# Patient Record
Sex: FEMALE | Race: WHITE
[De-identification: names, ages, dates, MRNs, and addresses within clinical notes are randomized per-mention and may not be internally consistent; named-entity substitution may affect disease eponyms.]

---

## 2017-02-16 ENCOUNTER — TRANSCRIPTION ENCOUNTER (OUTPATIENT)
Age: 37
End: 2017-02-16

## 2017-04-05 ENCOUNTER — TRANSCRIPTION ENCOUNTER (OUTPATIENT)
Age: 37
End: 2017-04-05

## 2017-05-25 ENCOUNTER — TRANSCRIPTION ENCOUNTER (OUTPATIENT)
Age: 37
End: 2017-05-25

## 2017-06-08 ENCOUNTER — TRANSCRIPTION ENCOUNTER (OUTPATIENT)
Age: 37
End: 2017-06-08

## 2017-11-26 ENCOUNTER — TRANSCRIPTION ENCOUNTER (OUTPATIENT)
Age: 37
End: 2017-11-26

## 2018-10-03 ENCOUNTER — TRANSCRIPTION ENCOUNTER (OUTPATIENT)
Age: 38
End: 2018-10-03

## 2018-11-09 ENCOUNTER — TRANSCRIPTION ENCOUNTER (OUTPATIENT)
Age: 38
End: 2018-11-09

## 2018-12-11 ENCOUNTER — RX RENEWAL (OUTPATIENT)
Age: 38
End: 2018-12-11

## 2018-12-18 ENCOUNTER — RESULT REVIEW (OUTPATIENT)
Age: 38
End: 2018-12-18

## 2018-12-18 ENCOUNTER — APPOINTMENT (OUTPATIENT)
Dept: INTERNAL MEDICINE | Facility: CLINIC | Age: 38
End: 2018-12-18
Payer: COMMERCIAL

## 2018-12-18 VITALS
SYSTOLIC BLOOD PRESSURE: 130 MMHG | BODY MASS INDEX: 41.16 KG/M2 | DIASTOLIC BLOOD PRESSURE: 82 MMHG | HEIGHT: 61 IN | WEIGHT: 218 LBS

## 2018-12-18 PROCEDURE — 99213 OFFICE O/P EST LOW 20 MIN: CPT

## 2018-12-18 NOTE — PHYSICAL EXAM

## 2018-12-18 NOTE — HISTORY OF PRESENT ILLNESS
[FreeTextEntry8] : Patient is a 38-year-old female returning after 5 weeks of therapy with hydrochlorothiazide for high blood pressure. She's tolerated the medications very well. Her sense of well-being is good. She is eating well sleeping well. No night sweats. No chills. No fever. She started a p.o. diet and has lost 12 pounds in the last 5 weeks. She is very happy. She is complaining of swelling around her right leg. She has known venous insufficiency, but she also has calf tenderness, and she also has positive Homans sign. Ultrasound is being sought.

## 2018-12-18 NOTE — PLAN
[FreeTextEntry1] : Patient is a 38-year-old female returning after 5 weeks of therapy with hydrochlorothiazide for high blood pressure. She's tolerated the medications very well. Her sense of well-being is good. She is eating well sleeping well. No night sweats. No chills. No fever. She started a p.o. diet and has lost 12 pounds in the last 5 weeks. She is very happy. She is complaining of swelling around her right leg. She has known venous insufficiency, but she also has calf tenderness, and she also has positive Homans sign. Ultrasound is being sought.

## 2018-12-21 ENCOUNTER — RX RENEWAL (OUTPATIENT)
Age: 38
End: 2018-12-21

## 2019-01-25 ENCOUNTER — RX RENEWAL (OUTPATIENT)
Age: 39
End: 2019-01-25

## 2019-02-19 ENCOUNTER — RECORD ABSTRACTING (OUTPATIENT)
Age: 39
End: 2019-02-19

## 2019-02-19 DIAGNOSIS — Z92.89 PERSONAL HISTORY OF OTHER MEDICAL TREATMENT: ICD-10-CM

## 2019-02-19 DIAGNOSIS — A63.0 ANOGENITAL (VENEREAL) WARTS: ICD-10-CM

## 2019-02-26 ENCOUNTER — TRANSCRIPTION ENCOUNTER (OUTPATIENT)
Age: 39
End: 2019-02-26

## 2019-02-27 ENCOUNTER — APPOINTMENT (OUTPATIENT)
Dept: INTERNAL MEDICINE | Facility: CLINIC | Age: 39
End: 2019-02-27

## 2019-03-12 ENCOUNTER — APPOINTMENT (OUTPATIENT)
Dept: INTERNAL MEDICINE | Facility: CLINIC | Age: 39
End: 2019-03-12

## 2019-05-22 ENCOUNTER — RX RENEWAL (OUTPATIENT)
Age: 39
End: 2019-05-22

## 2019-07-16 ENCOUNTER — RX RENEWAL (OUTPATIENT)
Age: 39
End: 2019-07-16

## 2019-08-14 ENCOUNTER — RX RENEWAL (OUTPATIENT)
Age: 39
End: 2019-08-14

## 2019-11-06 ENCOUNTER — APPOINTMENT (OUTPATIENT)
Dept: INTERNAL MEDICINE | Facility: CLINIC | Age: 39
End: 2019-11-06
Payer: COMMERCIAL

## 2019-11-06 PROCEDURE — 90686 IIV4 VACC NO PRSV 0.5 ML IM: CPT

## 2019-11-06 PROCEDURE — 90471 IMMUNIZATION ADMIN: CPT

## 2019-11-16 ENCOUNTER — RX RENEWAL (OUTPATIENT)
Age: 39
End: 2019-11-16

## 2020-01-30 ENCOUNTER — APPOINTMENT (OUTPATIENT)
Dept: OBGYN | Facility: CLINIC | Age: 40
End: 2020-01-30
Payer: COMMERCIAL

## 2020-01-30 VITALS
BODY MASS INDEX: 43.05 KG/M2 | DIASTOLIC BLOOD PRESSURE: 72 MMHG | SYSTOLIC BLOOD PRESSURE: 122 MMHG | WEIGHT: 228 LBS | HEIGHT: 61 IN

## 2020-01-30 DIAGNOSIS — R92.2 INCONCLUSIVE MAMMOGRAM: ICD-10-CM

## 2020-01-30 DIAGNOSIS — Z12.31 ENCOUNTER FOR SCREENING MAMMOGRAM FOR MALIGNANT NEOPLASM OF BREAST: ICD-10-CM

## 2020-01-30 PROCEDURE — 99396 PREV VISIT EST AGE 40-64: CPT

## 2020-01-30 RX ORDER — ASPIRIN ENTERIC COATED TABLETS 81 MG 81 MG/1
81 TABLET, DELAYED RELEASE ORAL
Refills: 0 | Status: DISCONTINUED | COMMUNITY
End: 2020-01-30

## 2020-02-10 ENCOUNTER — RX RENEWAL (OUTPATIENT)
Age: 40
End: 2020-02-10

## 2020-02-13 ENCOUNTER — RESULT REVIEW (OUTPATIENT)
Age: 40
End: 2020-02-13

## 2020-03-10 ENCOUNTER — APPOINTMENT (OUTPATIENT)
Dept: PAIN MANAGEMENT | Facility: CLINIC | Age: 40
End: 2020-03-10
Payer: COMMERCIAL

## 2020-03-10 VITALS
HEIGHT: 61 IN | DIASTOLIC BLOOD PRESSURE: 84 MMHG | SYSTOLIC BLOOD PRESSURE: 156 MMHG | WEIGHT: 220 LBS | BODY MASS INDEX: 41.54 KG/M2

## 2020-03-10 PROCEDURE — 99204 OFFICE O/P NEW MOD 45 MIN: CPT

## 2020-03-10 NOTE — PHYSICAL EXAM
[Facet Tenderness] : facet tenderness [Spine: Flexion to ___ degrees, without pain] : spine: flexion to [unfilled] degrees, without pain [SLR] : positive straight leg raise [] : Motor: [NL] : normal and symmetric bilaterally [Normal] : Normal affect [de-identified] : Constitutional: Normal, well developed, no acute distress\par Eyes: Symmetric, External structures \par Oropharynx: Lips normal, symmetric, no external lesions appreciated\par Respiratory: Non-labored breathing, no audible wheezes\par Cardiac: Pulse palpated, no tachycardia\par Vascular: No cyanosis appreciated, no edema in bilateral lower extremities\par GI: Nondistended, no jaundice appreciated\par Neurovascular: CN2-12 grossly intact, Alert and oriented\par MSK: Normal muscle bulk, 5/5 Motor strength B/L in LE\par \par

## 2020-03-10 NOTE — HISTORY OF PRESENT ILLNESS
[Back Pain] : back pain [___ yrs] : [unfilled] year(s) ago [2] : a minimum pain level of 2/10 [10] : a maximum pain level of 10/10 [Aching] : aching [5] : 3. What number best describes how, during the past week, pain has interfered with your general activity? 5/10 pain [FreeTextEntry1] : HPI\par \par Ms. EBER FIGUEROA is a 39 year F with pmhx of htn present with many years of bilateral back pain without inciting event.  It is axial and worse with transition.  Affects her ability to care for her children.  denies any worsening numbness, weakness, bowel/bladder dysfunction\par \par \par \par Previous and current pain medications/doses/effects:\par \par Advil without improvement\par \par Previous Pain Treatments:\par \par PT, Osteopathic treatment, Chiropractic care without improvement\par \par Previous Pain Injections:\par \par STEPHANI 1 year ago with mild improvement\par \par Previous Diagnostic Studies/Images:\par \par MRI LS 5/2018\par \par L5-S1 degenerative disc narrowing and endplate arthritis\par \par L5-S1 disc bulge spondylosis. No focal disc herniation. Mild bilateral degenerative frontal stenosis secondary to lateral degenerative changes. Mild facet arthritis.\par \par  [FreeTextEntry3] : per pt everything  [FreeTextEntry4] : per pt n/a  [FreeTextEntry2] : 15

## 2020-03-10 NOTE — ASSESSMENT
[FreeTextEntry1] : I personally reviewed the relevant imaging.  Discussed and explained to patient the likely source of pathology and pain.  Questions answered.\par \par Significant component of axial back pain secondary to lumbar spondylosis and facet arthropathy demonstrated on MRI LS.  Pain refractory to conservative treatments.  Will schedule BILATERAL L3-sacral ala diagnostic medial branch block r/b/a discussed\par \par May consider radiofreqency ablation\par \par \par The above diagnosis and treatment plan is medically reasonable and necessary based on the patient encounter.\par Opiod contract signed\par There were no barriers to communication.\par Informed patient that I would be available for any additional questions.\par Patient was instructed to call with any worsening symptoms including severe pain, new numbness/weakness, or changes in the bowel/bladder function. \par \par Discussed role of nsaids in pain management and all relevant risks, if patient is continuing to require after 4 weeks the patient should f/u for alternative treatment. \par Instructed patient to maintain pain diary to monitor pain level, mobility, and function.\par \par

## 2020-03-13 ENCOUNTER — APPOINTMENT (OUTPATIENT)
Dept: INTERNAL MEDICINE | Facility: CLINIC | Age: 40
End: 2020-03-13
Payer: COMMERCIAL

## 2020-03-13 VITALS
DIASTOLIC BLOOD PRESSURE: 92 MMHG | HEIGHT: 61 IN | WEIGHT: 220 LBS | SYSTOLIC BLOOD PRESSURE: 148 MMHG | BODY MASS INDEX: 41.54 KG/M2

## 2020-03-13 DIAGNOSIS — Z78.9 OTHER SPECIFIED HEALTH STATUS: ICD-10-CM

## 2020-03-13 PROCEDURE — 99213 OFFICE O/P EST LOW 20 MIN: CPT

## 2020-03-13 RX ORDER — HYDROCHLOROTHIAZIDE 50 MG/1
50 TABLET ORAL
Qty: 30 | Refills: 5 | Status: DISCONTINUED | COMMUNITY
Start: 2018-12-11 | End: 2020-03-13

## 2020-03-13 RX ORDER — YOHIMBE BARK 500 MG
CAPSULE ORAL
Refills: 0 | Status: DISCONTINUED | COMMUNITY
End: 2020-03-13

## 2020-03-13 NOTE — HISTORY OF PRESENT ILLNESS
[FreeTextEntry1] : Followup hypertension [de-identified] : 39-year-old overweight female presents for followup of hypertension. Blood pressure remained elevated on recent visits to pain management and urogynecologist in spite of taking hydrochlorothiazide 50 mg daily. Patient denies chest pain shortness of breath palpitations dizziness. Patient's mom has high blood pressure she is unsure of her father's status

## 2020-03-13 NOTE — PLAN
[FreeTextEntry1] : 39-year-old female with hypertension, no complaints offered. Discontinued hydrochlorothiazide 50 mg and started losartan 50 with hydrochlorothiazide 12.5 mg. Return to office 6 weeks for followup\par \par Patient is currently seeing pain management for back pain and uro-gynecologist for uterine prolapse, both situations make it difficult to exercise.. Reviewed diet

## 2020-04-16 ENCOUNTER — APPOINTMENT (OUTPATIENT)
Dept: PAIN MANAGEMENT | Facility: HOSPITAL | Age: 40
End: 2020-04-16

## 2020-05-04 ENCOUNTER — NON-APPOINTMENT (OUTPATIENT)
Age: 40
End: 2020-05-04

## 2020-05-04 ENCOUNTER — LABORATORY RESULT (OUTPATIENT)
Age: 40
End: 2020-05-04

## 2020-05-04 ENCOUNTER — APPOINTMENT (OUTPATIENT)
Dept: INTERNAL MEDICINE | Facility: CLINIC | Age: 40
End: 2020-05-04
Payer: COMMERCIAL

## 2020-05-04 VITALS
BODY MASS INDEX: 44.93 KG/M2 | SYSTOLIC BLOOD PRESSURE: 122 MMHG | DIASTOLIC BLOOD PRESSURE: 80 MMHG | WEIGHT: 238 LBS | HEIGHT: 61 IN

## 2020-05-04 PROCEDURE — 36415 COLL VENOUS BLD VENIPUNCTURE: CPT

## 2020-05-04 PROCEDURE — 93000 ELECTROCARDIOGRAM COMPLETE: CPT

## 2020-05-04 PROCEDURE — 99396 PREV VISIT EST AGE 40-64: CPT | Mod: 25

## 2020-05-04 NOTE — HEALTH RISK ASSESSMENT
[Very Good] : ~his/her~ current health as very good [Monthly or less (1 pt)] : Monthly or less (1 point) [Yes] : Yes [0-5] : 0-5 [Never (0 pts)] : Never (0 points) [1 or 2 (0 pts)] : 1 or 2 (0 points) [No falls in past year] : Patient reported no falls in the past year [0] : 2) Feeling down, depressed, or hopeless: Not at all (0) [Patient reported PAP Smear was normal] : Patient reported PAP Smear was normal [Patient reported mammogram was normal] : Patient reported mammogram was normal [Hepatitis C test declined] : Hepatitis C test declined [HIV test declined] : HIV test declined [] : No [IBG0Doyxe] : 0 [MammogramDate] : 02/20 [PapSmearDate] : 01/19

## 2020-05-04 NOTE — PLAN
[FreeTextEntry1] : 39-year-old female presents for annual exam, feels well, no complaints offered.\par \par Reviewed diet and exercise. Especially encouraged to improve diet and lose weight prior to surgery\par \par Call 3-4 days to review labs

## 2020-05-04 NOTE — PHYSICAL EXAM
[Obese] : patient was observed to be obese [Normal Female:] : bladder was normal on palpation [Normal] : no joint swelling, grossly normal strength/tone [FreeTextEntry1] : .r/GYN [de-identified] : Deferred GYN; Denies pain, lumps and discharge [de-identified] : No lymphadenopathy [de-identified] : Denies excessive thirst, urination, fatigue [de-identified] : Alert and Oriented x3.  Appropriate mood and affect [de-identified] : No rash or skin lesion

## 2020-05-04 NOTE — HISTORY OF PRESENT ILLNESS
[de-identified] : 39-year-old obese female presents for annual exam, feels well, having difficulty with weight particularly during this pandemic. Denies chest pain shortness of breath palpitations dizziness. Planning on cystocele repair and hysterectomy with Dr. Vargas next month, no date set yet.\par \par Denies chest pain shortness of breath palpitations dizziness\par \par Up-to-date with screenings [FreeTextEntry1] : Annual exam

## 2020-05-05 LAB
ALBUMIN SERPL ELPH-MCNC: 4.2 G/DL
ALP BLD-CCNC: 102 U/L
ALT SERPL-CCNC: 14 U/L
ANION GAP SERPL CALC-SCNC: 15 MMOL/L
APPEARANCE: ABNORMAL
AST SERPL-CCNC: 19 U/L
BASOPHILS # BLD AUTO: 0.11 K/UL
BASOPHILS NFR BLD AUTO: 0.9 %
BILIRUB SERPL-MCNC: 0.4 MG/DL
BILIRUBIN URINE: NEGATIVE
BLOOD URINE: NEGATIVE
BUN SERPL-MCNC: 8 MG/DL
CALCIUM SERPL-MCNC: 9.5 MG/DL
CHLORIDE SERPL-SCNC: 98 MMOL/L
CHOLEST SERPL-MCNC: 193 MG/DL
CHOLEST/HDLC SERPL: 3.2 RATIO
CO2 SERPL-SCNC: 25 MMOL/L
COLOR: NORMAL
CREAT SERPL-MCNC: 0.69 MG/DL
EOSINOPHIL # BLD AUTO: 0.5 K/UL
EOSINOPHIL NFR BLD AUTO: 4.3 %
FOLATE SERPL-MCNC: 8.4 NG/ML
GLUCOSE QUALITATIVE U: NEGATIVE
GLUCOSE SERPL-MCNC: 112 MG/DL
HCT VFR BLD CALC: 45.1 %
HDLC SERPL-MCNC: 60 MG/DL
HGB BLD-MCNC: 13.8 G/DL
IMM GRANULOCYTES NFR BLD AUTO: 0.6 %
KETONES URINE: NEGATIVE
LDLC SERPL CALC-MCNC: 111 MG/DL
LEUKOCYTE ESTERASE URINE: NEGATIVE
LYMPHOCYTES # BLD AUTO: 3.01 K/UL
LYMPHOCYTES NFR BLD AUTO: 25.9 %
MAN DIFF?: NORMAL
MCHC RBC-ENTMCNC: 25.7 PG
MCHC RBC-ENTMCNC: 30.6 GM/DL
MCV RBC AUTO: 84.1 FL
MONOCYTES # BLD AUTO: 0.62 K/UL
MONOCYTES NFR BLD AUTO: 5.3 %
NEUTROPHILS # BLD AUTO: 7.33 K/UL
NEUTROPHILS NFR BLD AUTO: 63 %
NITRITE URINE: NEGATIVE
PH URINE: 6.5
PLATELET # BLD AUTO: 357 K/UL
POTASSIUM SERPL-SCNC: 4.3 MMOL/L
PROT SERPL-MCNC: 7.1 G/DL
PROTEIN URINE: NEGATIVE
RBC # BLD: 5.36 M/UL
RBC # FLD: 14.6 %
SODIUM SERPL-SCNC: 138 MMOL/L
SPECIFIC GRAVITY URINE: 1.01
T4 FREE SERPL-MCNC: 0.8 NG/DL
TRIGL SERPL-MCNC: 108 MG/DL
TSH SERPL-ACNC: 1.17 UIU/ML
UROBILINOGEN URINE: NORMAL
VIT B12 SERPL-MCNC: 399 PG/ML
WBC # FLD AUTO: 11.64 K/UL

## 2020-05-26 ENCOUNTER — RX RENEWAL (OUTPATIENT)
Age: 40
End: 2020-05-26

## 2020-06-18 ENCOUNTER — APPOINTMENT (OUTPATIENT)
Dept: PAIN MANAGEMENT | Facility: CLINIC | Age: 40
End: 2020-06-18
Payer: COMMERCIAL

## 2020-06-18 PROCEDURE — 99214 OFFICE O/P EST MOD 30 MIN: CPT | Mod: 95

## 2020-06-18 NOTE — ASSESSMENT
[FreeTextEntry1] : \par Significant component of axial back pain secondary to lumbar spondylosis and facet arthropathy demonstrated on MRI LS.  Pain refractory to conservative treatments.  Will schedule BILATERAL L3-sacral ala diagnostic medial branch block r/b/a discussed\par \par May consider radiofreqency ablation\par \par ASIPP COVID Morbidity risk stratification:\par \par Age 0\par Pulmonary 0\par CVS 2\par Obesity 2\par DM 0\par Renal 0 \par Hepatic 0\par Immune 0\par \par 4  low risk for covid related morbidity -  discussed r/b/a, patient wishes to proceed\par \par Will schedule above interventional pain procedure because further delay may cause harm or negative outcome to patient.  The goal in performing this procedure is to avoid deterioration of function, emergency room visits (which increases exposure) and reliance on opioids.  \par \par r/b/a discussed with patient, lack of evidence to conclusively determine whether pain management procedures have any positive or negative impact on the possibility of camila the virus and/or development of any sequelae. \par \par Informed patient that risks associated with the COVID-19 infection.  Informed patient steps taken to limit the risks.  We are implementing safety precautions and following protocols consistent with the CDC and state recommendations. All patients and staff will be checked for fever or signs of illness upon entry to the facility. We will limit our steroid dose to the lowest effective therapeutic dose or in some cases steroids will not be injected at all. \par \par Patient agrees to proceed\par \par \par There were no barriers to communication.\par Informed patient that I would be available for any additional questions.\par Patient was instructed to call with any worsening symptoms including severe pain, new numbness/weakness, or changes in the bowel/bladder function. \par \par Discussed role of nsaids in pain management and all relevant risks, if patient is continuing to require after 4 weeks the patient should f/u for alternative treatment. \par Instructed patient to maintain pain diary to monitor pain level, mobility, and function.\par \par

## 2020-06-18 NOTE — HISTORY OF PRESENT ILLNESS
[Back Pain] : back pain [___ yrs] : [unfilled] year(s) ago [5] : an average pain level of 5/10 [2] : a minimum pain level of 2/10 [10] : a maximum pain level of 10/10 [Aching] : aching [Home] : at home, [unfilled] , at the time of the visit. [Medical Office: ([facility])___] : at the medical office located in  [Verbal consent obtained from patient] : the patient, [unfilled] [FreeTextEntry1] : Interval Note:\par \par Since last visit the pain is not improved.  States that she continues to have significant lower back pain, she has trouble sleeping at night because of the pain. States that the pain is worse with twisting and turning. She tried flexeril in the past without improvement. Patient Denies any additional weakness, numbness, bowel/bladder dysfunction.  \par \par \par HPI\par \par Ms. EBER FIGUEROA is a 39 year F with pmhx of htn present with many years of bilateral back pain without inciting event.  It is axial and worse with transition.  Affects her ability to care for her children.  denies any worsening numbness, weakness, bowel/bladder dysfunction\par \par \par \par Previous and current pain medications/doses/effects:\par \par Advil without improvement\par \par Previous Pain Treatments:\par \par PT, Osteopathic treatment, Chiropractic care without improvement\par \par Previous Pain Injections:\par \par STEPHANI 1 year ago with mild improvement\par \par Previous Diagnostic Studies/Images:\par \par MRI LS 5/2018\par \par L5-S1 degenerative disc narrowing and endplate arthritis\par \par L5-S1 disc bulge spondylosis. No focal disc herniation. Mild bilateral degenerative frontal stenosis secondary to lateral degenerative changes. Mild facet arthritis.\par \par  [FreeTextEntry3] : per pt everything  [FreeTextEntry4] : per pt n/a

## 2020-07-09 ENCOUNTER — APPOINTMENT (OUTPATIENT)
Dept: PAIN MANAGEMENT | Facility: HOSPITAL | Age: 40
End: 2020-07-09

## 2020-07-09 ENCOUNTER — RESULT REVIEW (OUTPATIENT)
Age: 40
End: 2020-07-09

## 2020-07-10 ENCOUNTER — APPOINTMENT (OUTPATIENT)
Dept: PAIN MANAGEMENT | Facility: CLINIC | Age: 40
End: 2020-07-10
Payer: COMMERCIAL

## 2020-07-10 PROCEDURE — 99213 OFFICE O/P EST LOW 20 MIN: CPT | Mod: 95

## 2020-07-10 NOTE — HISTORY OF PRESENT ILLNESS
[Home] : at home, [unfilled] , at the time of the visit. [Medical Office: ([facility])___] : at the medical office located in  [Verbal consent obtained from patient] : the patient, [unfilled] [Back Pain] : back pain [___ yrs] : [unfilled] year(s) ago [5] : an average pain level of 5/10 [2] : a minimum pain level of 2/10 [10] : a maximum pain level of 10/10 [Aching] : aching [FreeTextEntry1] : Interval Note:\par s/p bilateral L3-sacral ala medial branch block with 100% improvement in axial back pain.  Patient states that she is doing very well.  Able to move without pain since intervention. \par Since last visit the pain is improved. Denies any additional weakness, numbness, bowel/bladder dysfunction.  \par \par \par HPI\par \par Ms. EBER FIGUEROA is a 39 year F with pmhx of htn present with many years of bilateral back pain without inciting event.  It is axial and worse with transition.  Affects her ability to care for her children.  denies any worsening numbness, weakness, bowel/bladder dysfunction\par \par \par \par Previous and current pain medications/doses/effects:\par \par Advil without improvement\par \par Previous Pain Treatments:\par \par PT, Osteopathic treatment, Chiropractic care without improvement\par \par Previous Pain Injections:\par s/p bilateral L3-sacral ala medial branch block with 100% improvement in axial back pain 7/9/2020\par STEPHANI 1 year ago with mild improvement\par \par Previous Diagnostic Studies/Images:\par \par MRI LS 5/2018\par \par L5-S1 degenerative disc narrowing and endplate arthritis\par \par L5-S1 disc bulge spondylosis. No focal disc herniation. Mild bilateral degenerative frontal stenosis secondary to lateral degenerative changes. Mild facet arthritis.\par \par  [FreeTextEntry3] : per pt everything  [FreeTextEntry4] : per pt n/a

## 2020-07-10 NOTE — ASSESSMENT
[FreeTextEntry1] : \par Significant component of axial back pain secondary to lumbar spondylosis and facet arthropathy demonstrated on MRI LS.  Pain refractory to conservative treatments.  s/p BILATERAL L3-sacral ala diagnostic medial branch block \par \par may be sustained - will evaluate in office \par \par May consider radiofreqency ablation\par \par The above diagnosis and treatment plan is medically reasonable and necessary based on the patient encounter.\par \par There were no barriers to communication.\par Informed patient that I would be available for any additional questions.\par Patient was instructed to call with any worsening symptoms including severe pain, new numbness/weakness, or changes in the bowel/bladder function. \par \par Discussed role of nsaids in pain management and all relevant risks, if patient is continuing to require after 4 weeks the patient should f/u for alternative treatment. \par Instructed patient to maintain pain diary to monitor pain level, mobility, and function.\par \par

## 2020-07-24 ENCOUNTER — APPOINTMENT (OUTPATIENT)
Dept: PAIN MANAGEMENT | Facility: HOSPITAL | Age: 40
End: 2020-07-24

## 2020-07-24 ENCOUNTER — APPOINTMENT (OUTPATIENT)
Dept: PAIN MANAGEMENT | Facility: CLINIC | Age: 40
End: 2020-07-24
Payer: COMMERCIAL

## 2020-07-24 VITALS
DIASTOLIC BLOOD PRESSURE: 62 MMHG | SYSTOLIC BLOOD PRESSURE: 114 MMHG | BODY MASS INDEX: 44.93 KG/M2 | WEIGHT: 238 LBS | TEMPERATURE: 98 F | HEIGHT: 61 IN

## 2020-07-24 PROCEDURE — 99214 OFFICE O/P EST MOD 30 MIN: CPT

## 2020-07-24 NOTE — HISTORY OF PRESENT ILLNESS
[8] : 3. What number best describes how, during the past week, pain has interfered with your general activity? 8/10 pain [Back Pain] : back pain [___ yrs] : [unfilled] year(s) ago [2] : a minimum pain level of 2/10 [5] : an average pain level of 5/10 [10] : a maximum pain level of 10/10 [Aching] : aching [FreeTextEntry1] : Interval Note:\par s/p bilateral L3-sacral ala medial branch block with 100% improvement in axial back pain for 48 hours.  Pain improvement is no longer sustained.  Patient complains of L>R lumbar back pain, axial, worse with transition.  \par Since last visit the pain is improved. Denies any additional weakness, numbness, bowel/bladder dysfunction.  \par \par \par HPI\par \par Ms. EBER FIGUEROA is a 39 year F with pmhx of htn present with many years of bilateral back pain without inciting event.  It is axial and worse with transition.  Affects her ability to care for her children.  denies any worsening numbness, weakness, bowel/bladder dysfunction\par \par \par \par Previous and current pain medications/doses/effects:\par \par Advil without improvement\par \par Previous Pain Treatments:\par \par PT, Osteopathic treatment, Chiropractic care without improvement\par \par Previous Pain Injections:\par s/p bilateral L3-sacral ala medial branch block with 100% improvement in axial back pain 7/9/2020\par STEPHANI 1 year ago with mild improvement\par \par Previous Diagnostic Studies/Images:\par \par MRI LS 5/2018\par \par L5-S1 degenerative disc narrowing and endplate arthritis\par \par L5-S1 disc bulge spondylosis. No focal disc herniation. Mild bilateral degenerative frontal stenosis secondary to lateral degenerative changes. Mild facet arthritis.\par \par  [FreeTextEntry3] : per pt everything  [FreeTextEntry4] : per pt n/a  [FreeTextEntry2] : 24

## 2020-07-24 NOTE — PHYSICAL EXAM
[Facet Tenderness] : facet tenderness [Spine: Flexion to ___ degrees, without pain] : spine: flexion to [unfilled] degrees, without pain [Normal] : Gait: normal [] : Motor: [NL] : normal and symmetric bilaterally

## 2020-07-24 NOTE — ASSESSMENT
[FreeTextEntry1] : \par Significant component of axial back pain secondary to lumbar spondylosis and facet arthropathy demonstrated on MRI LS.  Pain refractory to conservative treatments.  s/p BILATERAL L3-sacral ala diagnostic medial branch block  with 100% improvement of pain for 48 hours.\par \par Given significant relief from diagnostic lumbar medial branch block of >80%, and continued lumbar facet arthropathy pain and axial back pain, will schedule LEFT then RIGHT  L3-sacral ala medial branch radiofrequency ablation at 80C for 2:30 min r/b/a discussed\par \par Will schedule above interventional pain procedure because further delay may cause harm or negative outcome to patient.  The goal in performing this procedure is to avoid deterioration of function, emergency room visits (which increases exposure) and reliance on opioids.  \par \par r/b/a discussed with patient, lack of evidence to conclusively determine whether pain management procedures have any positive or negative impact on the possibility of camila the virus and/or development of any sequelae. \par \par Informed patient that risks associated with the COVID-19 infection.  Informed patient steps taken to limit the risks.  We are implementing safety precautions and following protocols consistent with the CDC and state recommendations. All patients and staff will be checked for fever or signs of illness upon entry to the facility. We will limit our steroid dose to the lowest effective therapeutic dose or in some cases steroids will not be injected at all. \par \par Patient agrees to proceed\par \par \par The above diagnosis and treatment plan is medically reasonable and necessary based on the patient encounter.\par \par There were no barriers to communication.\par Informed patient that I would be available for any additional questions.\par Patient was instructed to call with any worsening symptoms including severe pain, new numbness/weakness, or changes in the bowel/bladder function. \par \par Discussed role of nsaids in pain management and all relevant risks, if patient is continuing to require after 4 weeks the patient should f/u for alternative treatment. \par Instructed patient to maintain pain diary to monitor pain level, mobility, and function.\par \par

## 2020-07-31 ENCOUNTER — RESULT REVIEW (OUTPATIENT)
Age: 40
End: 2020-07-31

## 2020-08-03 ENCOUNTER — RESULT REVIEW (OUTPATIENT)
Age: 40
End: 2020-08-03

## 2020-08-03 ENCOUNTER — APPOINTMENT (OUTPATIENT)
Dept: PAIN MANAGEMENT | Facility: HOSPITAL | Age: 40
End: 2020-08-03

## 2020-08-04 ENCOUNTER — RESULT REVIEW (OUTPATIENT)
Age: 40
End: 2020-08-04

## 2020-08-07 ENCOUNTER — RESULT REVIEW (OUTPATIENT)
Age: 40
End: 2020-08-07

## 2020-08-07 ENCOUNTER — APPOINTMENT (OUTPATIENT)
Dept: PAIN MANAGEMENT | Facility: HOSPITAL | Age: 40
End: 2020-08-07

## 2020-08-23 ENCOUNTER — RESULT REVIEW (OUTPATIENT)
Age: 40
End: 2020-08-23

## 2020-08-24 ENCOUNTER — RX RENEWAL (OUTPATIENT)
Age: 40
End: 2020-08-24

## 2020-08-28 ENCOUNTER — APPOINTMENT (OUTPATIENT)
Dept: PAIN MANAGEMENT | Facility: CLINIC | Age: 40
End: 2020-08-28
Payer: COMMERCIAL

## 2020-08-28 VITALS
WEIGHT: 238 LBS | BODY MASS INDEX: 44.93 KG/M2 | HEIGHT: 61 IN | SYSTOLIC BLOOD PRESSURE: 122 MMHG | DIASTOLIC BLOOD PRESSURE: 90 MMHG | TEMPERATURE: 98.7 F

## 2020-08-28 PROCEDURE — 99214 OFFICE O/P EST MOD 30 MIN: CPT

## 2020-08-28 NOTE — HISTORY OF PRESENT ILLNESS
[0 (No Pain)] : 1. What number best describes your pain on average in the past week? 0/10 pain [0 (Does not interfere)] : 3. What number best describes how, during the past week, pain has interfered with your general activity? 0/10 pain [Back Pain] : back pain [___ yrs] : [unfilled] year(s) ago [2] : a minimum pain level of 2/10 [10] : a maximum pain level of 10/10 [Aching] : aching [FreeTextEntry1] : Interval Note:\par \par s/p LEFT then RIGHT  L3-sacral ala medial branch radiofrequency ablation completed 8/7/2020 with sustained improvement bilateral lower back.  Patient complains of pain over bilateral knee R>L.  Pain is worse with ambulation.  Pain is so bad that she finds it difficult to perform adls and ambulate\par  Denies any additional weakness, numbness, bowel/bladder dysfunction.  \par \par \par HPI\par \par Ms. EBER FIGUEROA is a 39 year F with pmhx of htn present with many years of bilateral back pain without inciting event.  It is axial and worse with transition.  Affects her ability to care for her children.  denies any worsening numbness, weakness, bowel/bladder dysfunction\par \par \par \par Previous and current pain medications/doses/effects:\par \par Advil without improvement\par \par Previous Pain Treatments:\par \par PT, Osteopathic treatment, Chiropractic care without improvement\par \par Previous Pain Injections:\par \par  LEFT then RIGHT  L3-sacral ala medial branch radiofrequency ablation completed 8/7/2020\par s/p bilateral L3-sacral ala medial branch block with 100% improvement in axial back pain 7/9/2020\par STEPHANI 1 year ago with mild improvement\par \par Previous Diagnostic Studies/Images:\par \par MRI LS 5/2018\par \par L5-S1 degenerative disc narrowing and endplate arthritis\par \par L5-S1 disc bulge spondylosis. No focal disc herniation. Mild bilateral degenerative frontal stenosis secondary to lateral degenerative changes. Mild facet arthritis.\par \par  [5] : 2. What number best describes how, during the past week, pain has interfered with your enjoyment of life? 5/10 pain [FreeTextEntry2] : 0 [FreeTextEntry4] : per pt n/a  [FreeTextEntry3] : per pt everything

## 2020-08-28 NOTE — ASSESSMENT
[FreeTextEntry1] : \par Significant component of axial back pain secondary to lumbar spondylosis and facet arthropathy demonstrated on MRI LS.  Pain refractory to conservative treatments.  s/p BILATERAL L3-sacral ala diagnostic medial branch block  with 100% improvement of pain for 48 hours.\par \par Given significant relief from diagnostic lumbar medial branch block of >80%, and continued lumbar facet arthropathy pain and axial back pain, s/p LEFT then RIGHT  L3-sacral ala medial branch radiofrequency ablation with sustained improvement\par \par start PT referral provided\par \par Progressive right knee pain secondary to osteoarthritis refractory to conservative treatments, will schedule right knee steroid injection r/b/a discussed\par \par informed patient of risks of steroid administration including transient worsening of blood glucose, htn, mood changes, progressive osteoporosis.  Encouraged to call with questions and concerns.\par \par \par Will schedule above interventional pain procedure because further delay may cause harm or negative outcome to patient.  The goal in performing this procedure is to avoid deterioration of function, emergency room visits (which increases exposure) and reliance on opioids.  \par \par r/b/a discussed with patient, lack of evidence to conclusively determine whether pain management procedures have any positive or negative impact on the possibility of camila the virus and/or development of any sequelae. \par \par Patient agrees to proceed\par \par \par The above diagnosis and treatment plan is medically reasonable and necessary based on the patient encounter.\par \par There were no barriers to communication.\par Informed patient that I would be available for any additional questions.\par Patient was instructed to call with any worsening symptoms including severe pain, new numbness/weakness, or changes in the bowel/bladder function. \par \par Discussed role of nsaids in pain management and all relevant risks, if patient is continuing to require after 4 weeks the patient should f/u for alternative treatment. \par Instructed patient to maintain pain diary to monitor pain level, mobility, and function.\par \par

## 2020-08-28 NOTE — PHYSICAL EXAM
[Normal muscle bulk without asymmetry] : normal muscle bulk without asymmetry [Normal] : Normal affect [Facet Tenderness] : no facet tenderness [Within functional limits and without pain] : within functional limits and without pain [de-identified] : right knee TTP \par pain on valgus and varus strain\par

## 2020-08-31 ENCOUNTER — NON-APPOINTMENT (OUTPATIENT)
Age: 40
End: 2020-08-31

## 2020-08-31 ENCOUNTER — APPOINTMENT (OUTPATIENT)
Dept: INTERNAL MEDICINE | Facility: CLINIC | Age: 40
End: 2020-08-31
Payer: COMMERCIAL

## 2020-08-31 ENCOUNTER — LABORATORY RESULT (OUTPATIENT)
Age: 40
End: 2020-08-31

## 2020-08-31 VITALS
BODY MASS INDEX: 45.31 KG/M2 | SYSTOLIC BLOOD PRESSURE: 144 MMHG | HEIGHT: 61 IN | DIASTOLIC BLOOD PRESSURE: 86 MMHG | WEIGHT: 240 LBS

## 2020-08-31 DIAGNOSIS — N81.4 UTEROVAGINAL PROLAPSE, UNSPECIFIED: ICD-10-CM

## 2020-08-31 DIAGNOSIS — R32 UNSPECIFIED URINARY INCONTINENCE: ICD-10-CM

## 2020-08-31 PROCEDURE — 99214 OFFICE O/P EST MOD 30 MIN: CPT | Mod: 25

## 2020-08-31 PROCEDURE — 36415 COLL VENOUS BLD VENIPUNCTURE: CPT

## 2020-08-31 PROCEDURE — 93000 ELECTROCARDIOGRAM COMPLETE: CPT

## 2020-08-31 NOTE — REVIEW OF SYSTEMS
[Incontinence] : incontinence [Negative] : Heme/Lymph [Dysuria] : no dysuria [Hematuria] : no hematuria [Frequency] : no frequency [Vaginal Discharge] : no vaginal discharge

## 2020-08-31 NOTE — ASSESSMENT
[Patient Optimized for Surgery] : Patient optimized for surgery [As per surgery] : as per surgery [No Further Testing Recommended] : no further testing recommended [FreeTextEntry4] : MEDICALLY CLEARED FOR PROPOSED SURGERY

## 2020-08-31 NOTE — PHYSICAL EXAM
[Normal Female:] : bladder was normal on palpation [Normal] : normal gait, coordination grossly intact, no focal deficits [de-identified] : Deferred GYN; Denies pain, lumps and discharge [FreeTextEntry1] : Deferred GI/GYN [de-identified] : No lymphadenopathy [de-identified] : Denies excessive thirst, urination, fatigue [de-identified] : No rash or skin lesion [de-identified] : Alert and Oriented x3.  Appropriate mood and affect

## 2020-08-31 NOTE — PLAN
[FreeTextEntry1] : 39-year-old female scheduled for procedure as noted. Denies chest pain shortness of breath palpitations dizziness, no lower extremity edema. Currently free from infectious disease

## 2020-08-31 NOTE — HISTORY OF PRESENT ILLNESS
[No Pertinent Cardiac History] : no history of aortic stenosis, atrial fibrillation, coronary artery disease, recent myocardial infarction, or implantable device/pacemaker [No Pertinent Pulmonary History] : no history of asthma, COPD, sleep apnea, or smoking [No Adverse Anesthesia Reaction] : no adverse anesthesia reaction in self or family member [(Patient denies any chest pain, claudication, dyspnea on exertion, orthopnea, palpitations or syncope)] : Patient denies any chest pain, claudication, dyspnea on exertion, orthopnea, palpitations or syncope [Chronic Anticoagulation] : no chronic anticoagulation [Chronic Kidney Disease] : no chronic kidney disease [Diabetes] : no diabetes [FreeTextEntry1] : Robotic ROBERTH, Sling and Colporrhaphy, Rectocele [FreeTextEntry2] : 9/8/2020 [FreeTextEntry3] : Dr Katherine Vargas [FreeTextEntry4] : 39-year-old female with urinary incontinence scheduled for above procedure. Denies chest pain shortness of breath palpitations dizziness. Patient is currently free from infectious disease

## 2020-09-01 LAB
ALBUMIN SERPL ELPH-MCNC: 4.2 G/DL
ALP BLD-CCNC: 103 U/L
ALT SERPL-CCNC: 17 U/L
ANION GAP SERPL CALC-SCNC: 15 MMOL/L
APTT BLD: 34.1 SEC
AST SERPL-CCNC: 20 U/L
BASOPHILS # BLD AUTO: 0.1 K/UL
BASOPHILS NFR BLD AUTO: 0.9 %
BILIRUB SERPL-MCNC: 0.3 MG/DL
BUN SERPL-MCNC: 8 MG/DL
CALCIUM SERPL-MCNC: 9.4 MG/DL
CHLORIDE SERPL-SCNC: 98 MMOL/L
CO2 SERPL-SCNC: 25 MMOL/L
CREAT SERPL-MCNC: 0.82 MG/DL
EOSINOPHIL # BLD AUTO: 0.42 K/UL
EOSINOPHIL NFR BLD AUTO: 3.9 %
GLUCOSE SERPL-MCNC: 148 MG/DL
HCT VFR BLD CALC: 47.4 %
HGB BLD-MCNC: 14.4 G/DL
IMM GRANULOCYTES NFR BLD AUTO: 0.6 %
INR PPP: 1.01 RATIO
LYMPHOCYTES # BLD AUTO: 2.67 K/UL
LYMPHOCYTES NFR BLD AUTO: 24.7 %
MAN DIFF?: NORMAL
MCHC RBC-ENTMCNC: 26.6 PG
MCHC RBC-ENTMCNC: 30.4 GM/DL
MCV RBC AUTO: 87.5 FL
MONOCYTES # BLD AUTO: 0.48 K/UL
MONOCYTES NFR BLD AUTO: 4.4 %
NEUTROPHILS # BLD AUTO: 7.07 K/UL
NEUTROPHILS NFR BLD AUTO: 65.5 %
PLATELET # BLD AUTO: 316 K/UL
POTASSIUM SERPL-SCNC: 4.3 MMOL/L
PROT SERPL-MCNC: 7.3 G/DL
PT BLD: 12 SEC
RBC # BLD: 5.42 M/UL
RBC # FLD: 14.5 %
SODIUM SERPL-SCNC: 138 MMOL/L
WBC # FLD AUTO: 10.8 K/UL

## 2020-09-04 LAB
APPEARANCE: CLEAR
BILIRUBIN URINE: NEGATIVE
BLOOD URINE: ABNORMAL
COLOR: YELLOW
GLUCOSE QUALITATIVE U: NEGATIVE
KETONES URINE: NEGATIVE
LEUKOCYTE ESTERASE URINE: NEGATIVE
NITRITE URINE: NEGATIVE
PH URINE: 7.5
PROTEIN URINE: NEGATIVE
SPECIFIC GRAVITY URINE: 1.01
UROBILINOGEN URINE: NORMAL

## 2020-09-07 LAB — BACTERIA UR CULT: ABNORMAL

## 2020-10-19 ENCOUNTER — MED ADMIN CHARGE (OUTPATIENT)
Age: 40
End: 2020-10-19

## 2020-10-19 ENCOUNTER — APPOINTMENT (OUTPATIENT)
Dept: INTERNAL MEDICINE | Facility: CLINIC | Age: 40
End: 2020-10-19
Payer: COMMERCIAL

## 2020-10-19 PROCEDURE — 99072 ADDL SUPL MATRL&STAF TM PHE: CPT

## 2020-10-19 PROCEDURE — 90715 TDAP VACCINE 7 YRS/> IM: CPT

## 2020-10-19 PROCEDURE — 90471 IMMUNIZATION ADMIN: CPT

## 2020-10-20 ENCOUNTER — RX RENEWAL (OUTPATIENT)
Age: 40
End: 2020-10-20

## 2020-12-08 ENCOUNTER — APPOINTMENT (OUTPATIENT)
Dept: PAIN MANAGEMENT | Facility: CLINIC | Age: 40
End: 2020-12-08
Payer: COMMERCIAL

## 2020-12-08 VITALS
TEMPERATURE: 98.2 F | SYSTOLIC BLOOD PRESSURE: 132 MMHG | WEIGHT: 235 LBS | HEIGHT: 61 IN | BODY MASS INDEX: 44.37 KG/M2 | DIASTOLIC BLOOD PRESSURE: 84 MMHG

## 2020-12-08 PROCEDURE — 99072 ADDL SUPL MATRL&STAF TM PHE: CPT

## 2020-12-08 PROCEDURE — 99214 OFFICE O/P EST MOD 30 MIN: CPT

## 2020-12-08 NOTE — PHYSICAL EXAM
[Normal] : Well developed, in no acute distress, alert and oriented to person, place and time [Normal muscle bulk without asymmetry] : normal muscle bulk without asymmetry [Facet Tenderness] : no facet tenderness [de-identified] : Left knee TTP \par pain on valgus and varus strain\par \par right knee TTP \par pain on valgus and varus strain\par

## 2020-12-08 NOTE — HISTORY OF PRESENT ILLNESS
[8] : 3. What number best describes how, during the past week, pain has interfered with your general activity? 8/10 pain [Back Pain] : back pain [___ yrs] : [unfilled] year(s) ago [5] : an average pain level of 5/10 [2] : a minimum pain level of 2/10 [10] : a maximum pain level of 10/10 [Aching] : aching [FreeTextEntry1] : Interval Note:\par \par Back pain continues to be improved.  Complains today of continued bilateral knee pain. R>L.   Pain is so bad that patient finds it difficult to perform adls and ambulate. denies any worsening numbness, weakness, bowel/bladder dysfunction\par \par \par HPI\par \par Ms. EBER FIGUEROA is a 40 year F with pmhx of htn present with many years of bilateral back pain without inciting event.  It is axial and worse with transition.  Affects her ability to care for her children.  denies any worsening numbness, weakness, bowel/bladder dysfunction\par \par \par \par Previous and current pain medications/doses/effects:\par \par Advil without improvement\par \par Previous Pain Treatments:\par \par PT, Osteopathic treatment, Chiropractic care without improvement\par \par Previous Pain Injections:\par \par  LEFT then RIGHT  L3-sacral ala medial branch radiofrequency ablation completed 8/7/2020\par s/p bilateral L3-sacral ala medial branch block with 100% improvement in axial back pain 7/9/2020\par STEPHANI 1 year ago with mild improvement\par \par Previous Diagnostic Studies/Images:\par \par MRI LS 5/2018\par \par L5-S1 degenerative disc narrowing and endplate arthritis\par \par L5-S1 disc bulge spondylosis. No focal disc herniation. Mild bilateral degenerative frontal stenosis secondary to lateral degenerative changes. Mild facet arthritis.\par \par  [FreeTextEntry3] : per pt everything  [FreeTextEntry4] : per pt n/a  [FreeTextEntry2] : 24

## 2020-12-08 NOTE — ASSESSMENT
[FreeTextEntry1] : \par Significant component of axial back pain secondary to lumbar spondylosis and facet arthropathy demonstrated on MRI LS.  Pain refractory to conservative treatments.  s/p BILATERAL L3-sacral ala diagnostic medial branch block  with 100% improvement of pain for 48 hours.\par \par Given significant relief from diagnostic lumbar medial branch block of >80%, and continued lumbar facet arthropathy pain and axial back pain, s/p LEFT then RIGHT  L3-sacral ala medial branch radiofrequency ablation with sustained improvement\par \par start PT referral provided\par \par XR to evaluate progression of knee osteoarthritis\par \par Progressive right knee pain secondary to osteoarthritis refractory to conservative treatments, will schedule right knee steroid injection r/b/a discussed\par \par informed patient of risks of steroid administration including transient worsening of blood glucose, htn, mood changes, progressive osteoporosis.  Encouraged to call with questions and concerns.\par \par \par Progressive right knee pain secondary to osteoarthritis refractory to conservative treatments, will schedule right knee steroid injection r/b/a discussed\par \par informed patient of risks of steroid administration including transient worsening of blood glucose, htn, mood changes, progressive osteoporosis.  Encouraged to call with questions and concerns.\par \par Will schedule above interventional pain procedure because further delay may cause harm or negative outcome to patient.  The goal in performing this procedure is to avoid deterioration of function, emergency room visits (which increases exposure) and reliance on opioids.  \par \par r/b/a discussed with patient, lack of evidence to conclusively determine whether pain management procedures have any positive or negative impact on the possibility of camila the virus and/or development of any sequelae. \par \par Patient agrees to proceed\par \par \par The above diagnosis and treatment plan is medically reasonable and necessary based on the patient encounter.\par \par There were no barriers to communication.\par Informed patient that I would be available for any additional questions.\par Patient was instructed to call with any worsening symptoms including severe pain, new numbness/weakness, or changes in the bowel/bladder function. \par \par Discussed role of nsaids in pain management and all relevant risks, if patient is continuing to require after 4 weeks the patient should f/u for alternative treatment. \par Instructed patient to maintain pain diary to monitor pain level, mobility, and function.\par \par

## 2020-12-23 ENCOUNTER — RESULT REVIEW (OUTPATIENT)
Age: 40
End: 2020-12-23

## 2020-12-23 PROBLEM — Z12.31 ENCOUNTER FOR SCREENING MAMMOGRAM FOR BREAST CANCER: Status: RESOLVED | Noted: 2020-01-30 | Resolved: 2020-12-23

## 2020-12-28 ENCOUNTER — APPOINTMENT (OUTPATIENT)
Dept: PAIN MANAGEMENT | Facility: CLINIC | Age: 40
End: 2020-12-28
Payer: COMMERCIAL

## 2020-12-28 VITALS
SYSTOLIC BLOOD PRESSURE: 142 MMHG | TEMPERATURE: 98 F | BODY MASS INDEX: 44.37 KG/M2 | WEIGHT: 235 LBS | DIASTOLIC BLOOD PRESSURE: 86 MMHG | HEIGHT: 61 IN

## 2020-12-28 PROCEDURE — 99072 ADDL SUPL MATRL&STAF TM PHE: CPT

## 2020-12-28 PROCEDURE — 99213 OFFICE O/P EST LOW 20 MIN: CPT | Mod: 25

## 2020-12-28 PROCEDURE — 20611 DRAIN/INJ JOINT/BURSA W/US: CPT

## 2020-12-28 RX ORDER — IBUPROFEN 600 MG/1
600 TABLET, FILM COATED ORAL
Qty: 30 | Refills: 0 | Status: DISCONTINUED | COMMUNITY
Start: 2020-09-09

## 2020-12-28 RX ORDER — CEPHALEXIN 500 MG/1
500 CAPSULE ORAL
Qty: 20 | Refills: 0 | Status: DISCONTINUED | COMMUNITY
Start: 2020-09-17

## 2020-12-28 RX ORDER — CIPROFLOXACIN HYDROCHLORIDE 500 MG/1
500 TABLET, FILM COATED ORAL
Qty: 10 | Refills: 0 | Status: DISCONTINUED | COMMUNITY
Start: 2020-10-07

## 2020-12-28 RX ORDER — FLUCONAZOLE 150 MG/1
150 TABLET ORAL
Qty: 2 | Refills: 0 | Status: DISCONTINUED | COMMUNITY
Start: 2020-09-22

## 2020-12-28 NOTE — PHYSICAL EXAM
[Normal] : Well developed, in no acute distress, alert and oriented to person, place and time [Normal muscle bulk without asymmetry] : normal muscle bulk without asymmetry [Within functional limits and without pain] : within functional limits and without pain [] : Motor: [NL] : normal and symmetric bilaterally

## 2020-12-28 NOTE — ASSESSMENT
[FreeTextEntry1] : \par Significant component of axial back pain secondary to lumbar spondylosis and facet arthropathy demonstrated on MRI LS.  Pain refractory to conservative treatments.  s/p BILATERAL L3-sacral ala diagnostic medial branch block  with 100% improvement of pain for 48 hours.\par \par Given significant relief from diagnostic lumbar medial branch block of >80%, and continued lumbar facet arthropathy pain and axial back pain, s/p LEFT then RIGHT  L3-sacral ala medial branch radiofrequency ablation with sustained improvement\par \par start PT referral provided\par \par XR to evaluate progression of knee osteoarthritis\par \par I personally reviewed the relevant imaging.  Discussed and explained to patient the likely source of pathology and pain.  Questions answered.\par \par Progressive LET knee pain secondary to osteoarthritis refractory to conservative treatments, will schedule LEFT knee steroid injection r/b/a discussed\par \par informed patient of risks of steroid administration including transient worsening of blood glucose, htn, mood changes, progressive osteoporosis.  Encouraged to call with questions and concerns.\par \par \par Progressive right knee pain secondary to osteoarthritis refractory to conservative treatments, will perform right knee steroid injection r/b/a discussed\par \par informed patient of risks of steroid administration including transient worsening of blood glucose, htn, mood changes, progressive osteoporosis.  Encouraged to call with questions and concerns.\par \par Will schedule above interventional pain procedure because further delay may cause harm or negative outcome to patient.  The goal in performing this procedure is to avoid deterioration of function, emergency room visits (which increases exposure) and reliance on opioids.  \par \par r/b/a discussed with patient, lack of evidence to conclusively determine whether pain management procedures have any positive or negative impact on the possibility of camila the virus and/or development of any sequelae. \par \par Patient agrees to proceed\par \par \par The above diagnosis and treatment plan is medically reasonable and necessary based on the patient encounter.\par \par There were no barriers to communication.\par Informed patient that I would be available for any additional questions.\par Patient was instructed to call with any worsening symptoms including severe pain, new numbness/weakness, or changes in the bowel/bladder function. \par \par Discussed role of nsaids in pain management and all relevant risks, if patient is continuing to require after 4 weeks the patient should f/u for alternative treatment. \par Instructed patient to maintain pain diary to monitor pain level, mobility, and function.\par \par \par PROCEDURE NOTE\par \par KNEE INJECTION ULTRASOUND \par \par The patient was given opportunity to ask questions regarding the procedure, its indications and the associated risks.  The risks of the procedure discussed include but are not limited to infection, bleeding, allergic reactions, nerve injuries, and side effects.  The patient showed understanding and wished to proceed.\par \par After obtaining informed consent, the patient's chart was reviewed, the site of operation was marked, and the patient's identification was confirmed.  The patient was brought to the Operating Room and placed in the supine position on the operating room table with the knee slightly flexed to approximately 30 degrees. Routine monitors were applied.  A surgical timeout was performed.  No skin lesions or signs of cellulitis were noted.  Strict sterile technique was used.  Skin was prepped with Chlorhexadine solution and draped in sterile manner.\par \par Using sterile technique, a high-frequency, linear-array ultrasound probe was placed over the suprapatellar recess in a longitudinal orientation and the suprapatellar bursa was thus identified.  The probe was then rotated to a transverse orientation over the bursa and fluid in the band and the vastus lateralus by palpation.  Skin overlying this region was anesthetized using a [30]g needle and [1]cc 1% Lidocaine.  Following this, a [25]g  needle was inserted using an in-plane technique with the ultrasound such that the needle shaft and tip were visualized entering the suprapatellar bursa. There was no heme on aspiration.  No paresthesias were elicited throughout.  Following this a mixture of 20mg kenalog, 4cc 0.5% Bupivacaine was injected after negative intermittent heme aspirations.  There was no pain on injection.  The needle was then flushed with lidocaine and removed.  A band-aid dressing was applied.\par \par The patient tolerated the procedure well.  Vital signs remained stable throughout.  Post operative exam did not reveal any new neurological deficits.  Patient was sent to the recovery room in a stable condition.  The patient was asked to follow up in 2 weeks.\par \par The patient was instructed to call with fever, chills, increased pain, redness or swelling at the injection site, weakness, numbness or weakness in the leg.\par \par \par

## 2020-12-28 NOTE — HISTORY OF PRESENT ILLNESS
[0 (No Pain)] : 1. What number best describes your pain on average in the past week? 0/10 pain [1] : 3. What number best describes how, during the past week, pain has interfered with your general activity? 1/10 pain [Back Pain] : back pain [___ yrs] : [unfilled] year(s) ago [5] : an average pain level of 5/10 [2] : a minimum pain level of 2/10 [10] : a maximum pain level of 10/10 [Aching] : aching [FreeTextEntry1] : Interval Note:\par \par Back pain continues to be improved.  Patient has not started exercises or PT.  Complains today of continued bilateral knee pain. R>L.   Pain is so bad that patient finds it difficult to perform adls and ambulate. denies any worsening numbness, weakness, bowel/bladder dysfunction\par \par \par HPI\par \par Ms. EBER FIGUEROA is a 40 year F with pmhx of htn present with many years of bilateral back pain without inciting event.  It is axial and worse with transition.  Affects her ability to care for her children.  denies any worsening numbness, weakness, bowel/bladder dysfunction\par \par \par \par Previous and current pain medications/doses/effects:\par \par Advil without improvement\par \par Previous Pain Treatments:\par \par PT, Osteopathic treatment, Chiropractic care without improvement\par \par Previous Pain Injections:\par \par  right knee steroid injection 12/28/2020\par  LEFT then RIGHT  L3-sacral ala medial branch radiofrequency ablation completed 8/7/2020\par s/p bilateral L3-sacral ala medial branch block with 100% improvement in axial back pain 7/9/2020\par STEPHANI 1 year ago with mild improvement\par \par Previous Diagnostic Studies/Images:\par \par XR BL Knee 12/2020\par \par Minimal productive change at the lateral joint margin of each knee with the femoral\par tibial joint spaces maintained. There are bilateral patellar spurs evident with mild DJD of the\par right femoral patella joint space at the lateral margin.\par \par  No evidence of suprapatellar effusion. No evidence of fracture or suspicious lesion.\par \par \par  IMPRESSION: Mild degenerative changes as above.\par MRI LS 5/2018\par \par L5-S1 degenerative disc narrowing and endplate arthritis\par \par L5-S1 disc bulge spondylosis. No focal disc herniation. Mild bilateral degenerative frontal stenosis secondary to lateral degenerative changes. Mild facet arthritis.\par \par  [FreeTextEntry3] : per pt everything  [FreeTextEntry4] : per pt n/a  [FreeTextEntry2] : 2

## 2021-01-04 ENCOUNTER — APPOINTMENT (OUTPATIENT)
Dept: PAIN MANAGEMENT | Facility: CLINIC | Age: 41
End: 2021-01-04
Payer: COMMERCIAL

## 2021-01-04 VITALS
TEMPERATURE: 98.2 F | SYSTOLIC BLOOD PRESSURE: 120 MMHG | WEIGHT: 235 LBS | HEIGHT: 61 IN | DIASTOLIC BLOOD PRESSURE: 70 MMHG | BODY MASS INDEX: 44.37 KG/M2

## 2021-01-04 PROCEDURE — 99072 ADDL SUPL MATRL&STAF TM PHE: CPT

## 2021-01-04 PROCEDURE — 99212 OFFICE O/P EST SF 10 MIN: CPT | Mod: 25

## 2021-01-04 PROCEDURE — 20611 DRAIN/INJ JOINT/BURSA W/US: CPT

## 2021-01-04 RX ADMIN — Medication 1 MG/ML: at 00:00

## 2021-01-04 RX ADMIN — BUPIVACAINE HYDROCHLORIDE %: 5 INJECTION, SOLUTION PERINEURAL at 00:00

## 2021-01-04 RX ADMIN — Medication %: at 00:00

## 2021-01-04 NOTE — ASSESSMENT
[FreeTextEntry1] : \par Significant component of axial back pain secondary to lumbar spondylosis and facet arthropathy demonstrated on MRI LS.  Pain refractory to conservative treatments.  s/p BILATERAL L3-sacral ala diagnostic medial branch block  with 100% improvement of pain for 48 hours.\par \par Given significant relief from diagnostic lumbar medial branch block of >80%, and continued lumbar facet arthropathy pain and axial back pain, s/p LEFT then RIGHT  L3-sacral ala medial branch radiofrequency ablation with sustained improvement\par \par \par XR to evaluate progression of knee osteoarthritis\par \par I personally reviewed the relevant imaging.  Discussed and explained to patient the likely source of pathology and pain.  Questions answered.\par \par Progressive LET knee pain secondary to osteoarthritis refractory to conservative treatments, s/p LEFT knee steroid injection \par \par \par \par Progressive right knee pain secondary to osteoarthritis refractory to conservative treatments, will perform right knee steroid injection \par \par informed patient of risks of steroid administration including transient worsening of blood glucose, htn, mood changes, progressive osteoporosis.  Encouraged to call with questions and concerns.\par \par Will schedule above interventional pain procedure because further delay may cause harm or negative outcome to patient.  The goal in performing this procedure is to avoid deterioration of function, emergency room visits (which increases exposure) and reliance on opioids.  \par \par r/b/a discussed with patient, lack of evidence to conclusively determine whether pain management procedures have any positive or negative impact on the possibility of camila the virus and/or development of any sequelae. \par \par Patient agrees to proceed\par \par \par The above diagnosis and treatment plan is medically reasonable and necessary based on the patient encounter.\par \par There were no barriers to communication.\par Informed patient that I would be available for any additional questions.\par Patient was instructed to call with any worsening symptoms including severe pain, new numbness/weakness, or changes in the bowel/bladder function. \par \par Discussed role of nsaids in pain management and all relevant risks, if patient is continuing to require after 4 weeks the patient should f/u for alternative treatment. \par Instructed patient to maintain pain diary to monitor pain level, mobility, and function.\par \par \par PROCEDURE NOTE\par \par KNEE INJECTION ULTRASOUND \par \par The patient was given opportunity to ask questions regarding the procedure, its indications and the associated risks.  The risks of the procedure discussed include but are not limited to infection, bleeding, allergic reactions, nerve injuries, and side effects.  The patient showed understanding and wished to proceed.\par \par After obtaining informed consent, the patient's chart was reviewed, the site of operation was marked, and the patient's identification was confirmed.  The patient was brought to the Operating Room and placed in the supine position on the operating room table with the knee slightly flexed to approximately 30 degrees. Routine monitors were applied.  A surgical timeout was performed.  No skin lesions or signs of cellulitis were noted.  Strict sterile technique was used.  Skin was prepped with Chlorhexadine solution and draped in sterile manner.\par \par Using sterile technique, a high-frequency, linear-array ultrasound probe was placed over the suprapatellar recess in a longitudinal orientation and the suprapatellar bursa was thus identified.  The probe was then rotated to a transverse orientation over the bursa and fluid in the band and the vastus lateralus by palpation.  Skin overlying this region was anesthetized using a [30]g needle and [1]cc 1% Lidocaine.  Following this, a [25]g  needle was inserted using an in-plane technique with the ultrasound such that the needle shaft and tip were visualized entering the suprapatellar bursa. There was no heme on aspiration.  No paresthesias were elicited throughout.  Following this a mixture of 20mg kenalog, 4cc 0.5% Bupivacaine was injected after negative intermittent heme aspirations.  There was no pain on injection.  The needle was then flushed with lidocaine and removed.  A band-aid dressing was applied.\par \par The patient tolerated the procedure well.  Vital signs remained stable throughout.  Post operative exam did not reveal any new neurological deficits.  Patient was sent to the recovery room in a stable condition.  The patient was asked to follow up in 2 weeks.\par \par The patient was instructed to call with fever, chills, increased pain, redness or swelling at the injection site, weakness, numbness or weakness in the leg.\par \par \par

## 2021-01-04 NOTE — HISTORY OF PRESENT ILLNESS
[0 (No Pain)] : 1. What number best describes your pain on average in the past week? 0/10 pain [6] : 3. What number best describes how, during the past week, pain has interfered with your general activity? 6/10 pain [Back Pain] : back pain [___ yrs] : [unfilled] year(s) ago [5] : an average pain level of 5/10 [2] : a minimum pain level of 2/10 [10] : a maximum pain level of 10/10 [Aching] : aching [FreeTextEntry1] : Interval Note:\par s/p right knee steroid injection 12/28/2020 with significant improvement.  Presents today for left knee steroid injection \par Back pain continues to be improved.  Patient has not started exercises or PT.  Complains today of continued bilateral knee pain. R>L.   Pain is so bad that patient finds it difficult to perform adls and ambulate. denies any worsening numbness, weakness, bowel/bladder dysfunction\par \par \par HPI\par \par MsGaby FIGUEROA is a 40 year F with pmhx of htn present with many years of bilateral back pain without inciting event.  It is axial and worse with transition.  Affects her ability to care for her children.  denies any worsening numbness, weakness, bowel/bladder dysfunction\par \par \par \par Previous and current pain medications/doses/effects:\par \par Advil without improvement\par \par Previous Pain Treatments:\par \par PT, Osteopathic treatment, Chiropractic care without improvement\par \par Previous Pain Injections:\par \par  left knee steroid injection 1/4/2021\par  right knee steroid injection 12/28/2020\par  LEFT then RIGHT  L3-sacral ala medial branch radiofrequency ablation completed 8/7/2020\par s/p bilateral L3-sacral ala medial branch block with 100% improvement in axial back pain 7/9/2020\par STEPHNAI 1 year ago with mild improvement\par \par Previous Diagnostic Studies/Images:\par \par XR BL Knee 12/2020\par \par Minimal productive change at the lateral joint margin of each knee with the femoral\par tibial joint spaces maintained. There are bilateral patellar spurs evident with mild DJD of the\par right femoral patella joint space at the lateral margin.\par \par  No evidence of suprapatellar effusion. No evidence of fracture or suspicious lesion.\par \par \par  IMPRESSION: Mild degenerative changes as above.\par MRI LS 5/2018\par \par L5-S1 degenerative disc narrowing and endplate arthritis\par \par L5-S1 disc bulge spondylosis. No focal disc herniation. Mild bilateral degenerative frontal stenosis secondary to lateral degenerative changes. Mild facet arthritis.\par \par  [FreeTextEntry2] : 12 [FreeTextEntry3] : per pt everything  [FreeTextEntry4] : per pt n/a

## 2021-01-04 NOTE — PHYSICAL EXAM
[Normal] : Well developed, in no acute distress, alert and oriented to person, place and time [Within functional limits and without pain] : within functional limits and without pain [] : Motor: [NL] : normal and symmetric bilaterally

## 2021-01-10 ENCOUNTER — RX RENEWAL (OUTPATIENT)
Age: 41
End: 2021-01-10

## 2021-01-22 ENCOUNTER — APPOINTMENT (OUTPATIENT)
Dept: PAIN MANAGEMENT | Facility: CLINIC | Age: 41
End: 2021-01-22
Payer: COMMERCIAL

## 2021-01-22 PROCEDURE — 99213 OFFICE O/P EST LOW 20 MIN: CPT | Mod: 95

## 2021-01-22 NOTE — PHYSICAL EXAM
[de-identified] : \par Constitutional: Normal, well developed, no acute distress on audio/video examination\par Eyes: Symmetric, External structures on video examination\par ENT: Lips, mucosa and tongue normal on video examination\par Oropharynx: Lips normal, symmetric, no external lesions appreciated appreciated on video examination\par Respiratory: Non-labored breathing, no audible wheezes appreciated on audio/video examination\par Vascular: No cyanosis appreciated or edema appreciated on video examination\par GI:  no jaundice appreciated on video examination\par Neurovascular: CN grossly intact on video/audio examination, alert\par MSK: Normal muscle bulk on video examination\par

## 2021-01-22 NOTE — HISTORY OF PRESENT ILLNESS
[Back Pain] : back pain [___ yrs] : [unfilled] year(s) ago [5] : an average pain level of 5/10 [2] : a minimum pain level of 2/10 [10] : a maximum pain level of 10/10 [Aching] : aching [Home] : at home, [unfilled] , at the time of the visit. [Medical Office: ([facility])___] : at the medical office located in  [Verbal consent obtained from patient] : the patient, [unfilled] [FreeTextEntry1] : Interval Note:\par s/p right knee steroid injection 12/28/2020 with significant improvement. s/p left knee steroid injection 1/4/2021.  Patient reports no pain.  She is able to perform adls. denies any worsening numbness, weakness, bowel/bladder dysfunction\par \par HPI\par \par Ms. EBER FIGUEROA is a 40 year F with pmhx of htn present with many years of bilateral back pain without inciting event.  It is axial and worse with transition.  Affects her ability to care for her children.  denies any worsening numbness, weakness, bowel/bladder dysfunction\par \par \par \par Previous and current pain medications/doses/effects:\par \par Advil without improvement\par \par Previous Pain Treatments:\par \par PT, Osteopathic treatment, Chiropractic care without improvement\par \par Previous Pain Injections:\par \par  left knee steroid injection 1/4/2021\par  right knee steroid injection 12/28/2020\par  LEFT then RIGHT  L3-sacral ala medial branch radiofrequency ablation completed 8/7/2020\par s/p bilateral L3-sacral ala medial branch block with 100% improvement in axial back pain 7/9/2020\par STEPHANI 1 year ago with mild improvement\par \par Previous Diagnostic Studies/Images:\par \par XR BL Knee 12/2020\par \par Minimal productive change at the lateral joint margin of each knee with the femoral\par tibial joint spaces maintained. There are bilateral patellar spurs evident with mild DJD of the\par right femoral patella joint space at the lateral margin.\par \par  No evidence of suprapatellar effusion. No evidence of fracture or suspicious lesion.\par \par \par  IMPRESSION: Mild degenerative changes as above.\par MRI LS 5/2018\par \par L5-S1 degenerative disc narrowing and endplate arthritis\par \par L5-S1 disc bulge spondylosis. No focal disc herniation. Mild bilateral degenerative frontal stenosis secondary to lateral degenerative changes. Mild facet arthritis.\par \par  [FreeTextEntry3] : per pt everything  [FreeTextEntry4] : per pt n/a

## 2021-01-22 NOTE — ASSESSMENT
[FreeTextEntry1] : \par Significant component of axial back pain secondary to lumbar spondylosis and facet arthropathy demonstrated on MRI LS.  Pain refractory to conservative treatments.  s/p BILATERAL L3-sacral ala diagnostic medial branch block  with 100% improvement of pain for 48 hours.\par \par Given significant relief from diagnostic lumbar medial branch block of >80%, and continued lumbar facet arthropathy pain and axial back pain, s/p LEFT then RIGHT  L3-sacral ala medial branch radiofrequency ablation with sustained improvement\par \par I personally reviewed the relevant imaging.  Discussed and explained to patient the likely source of pathology and pain.  Questions answered.\par \par Progressive knee pain secondary to osteoarthritis refractory to conservative treatments, s/p LEFT and RIGHT knee steroid injection \par \par \par start PT - referral provided\par \par There were no barriers to communication.\par Informed patient that I would be available for any additional questions.\par Patient was instructed to call with any worsening symptoms including severe pain, new numbness/weakness, or changes in the bowel/bladder function. \par \par Discussed role of nsaids in pain management and all relevant risks, if patient is continuing to require after 4 weeks the patient should f/u for alternative treatment. \par Instructed patient to maintain pain diary to monitor pain level, mobility, and function.\par \par I explained to patient benefits and limitation of TeleMedicine visits\par \par Patient understands that limitations include inability to perform comprehensive physical exam, which may lead to potential diagnostic inconsistencies.  \par \par Patient understands that diagnosis and treatment may be limited by these inconsistencies and patient agrees to proceed with care plan\par \par \par \par

## 2021-01-25 DIAGNOSIS — Z12.31 ENCOUNTER FOR SCREENING MAMMOGRAM FOR MALIGNANT NEOPLASM OF BREAST: ICD-10-CM

## 2021-02-23 ENCOUNTER — APPOINTMENT (OUTPATIENT)
Dept: PAIN MANAGEMENT | Facility: CLINIC | Age: 41
End: 2021-02-23
Payer: COMMERCIAL

## 2021-02-23 PROCEDURE — 99213 OFFICE O/P EST LOW 20 MIN: CPT | Mod: 95

## 2021-02-23 NOTE — PHYSICAL EXAM
[Normal] : Well developed, in no acute distress, alert and oriented to person, place and time [de-identified] : \par Constitutional: Normal, well developed, no acute distress on audio/video examination\par Eyes: Symmetric, External structures on video examination\par ENT: Lips, mucosa and tongue normal on video examination\par Oropharynx: Lips normal, symmetric, no external lesions appreciated appreciated on video examination\par Respiratory: Non-labored breathing, no audible wheezes appreciated on audio/video examination\par Vascular: No cyanosis appreciated or edema appreciated on video examination\par GI:  no jaundice appreciated on video examination\par Neurovascular: CN grossly intact on video/audio examination, alert\par MSK: Normal muscle bulk on video examination\par

## 2021-02-23 NOTE — ASSESSMENT
[FreeTextEntry1] : \par Significant component of axial back pain secondary to lumbar spondylosis and facet arthropathy demonstrated on MRI LS.  Pain refractory to conservative treatments.  s/p BILATERAL L3-sacral ala diagnostic medial branch block  with 100% improvement of pain for 48 hours.\par \par Given significant relief from diagnostic lumbar medial branch block of >80%, and continued lumbar facet arthropathy pain and axial back pain, s/p LEFT then RIGHT  L3-sacral ala medial branch radiofrequency ablation with sustained improvement\par \par I personally reviewed the relevant imaging.  Discussed and explained to patient the likely source of pathology and pain.  Questions answered.\par \par Progressive knee pain secondary to osteoarthritis refractory to conservative treatments, s/p LEFT and RIGHT knee steroid injection with transient improvement\par \par start PT - referral provided\par \par Right knee pain secondary to arthritis refractory to conservative treatments and steroid injection.  Significantly debilitating to patient.  Will schedule right knee Viscosupplementation injection x 3 \par \par r/b/a discussed\par \par \par LEFT knee pain secondary to arthritis refractory to conservative treatments and steroid injection.  Significantly debilitating to patient.  Will schedule LEFT knee Viscosupplementation injection x 3 \par \par r/b/a discussed\par \par Patient is scheduled for procedure based on history, imaging and limited physical exam performed on TeleHealth visit.  If necessary, additional focal physical exam will be performed on date of procedure\par \par Will schedule above interventional pain procedure because further delay may cause harm or negative outcome to patient.  The goal in performing this procedure is to avoid deterioration of function, emergency room visits (which increases exposure) and reliance on opioids.  \par \par r/b/a discussed with patient, lack of evidence to conclusively determine whether pain management procedures have any positive or negative impact on the possibility of camila the virus and/or development of any sequelae. \par \par Patient counselled regarding timing steroid based intervention 2 weeks before or after COVID-19 vaccine administration to avoid any interaction or affect on efficacy of vaccination\par \par Patient demonstrates understanding\par \par Informed patient that risks associated with the COVID-19 infection.  Informed patient steps taken to limit the risks.  We are implementing safety precautions and following protocols consistent with the CDC and state recommendations. All patients and staff will be checked for fever or signs of illness upon entry to the facility. We will limit our steroid dose to the lowest effective therapeutic dose or in some cases steroids will not be injected at all. \par \par Patient agrees to proceed\par \par \par \par \par There were no barriers to communication.\par Informed patient that I would be available for any additional questions.\par Patient was instructed to call with any worsening symptoms including severe pain, new numbness/weakness, or changes in the bowel/bladder function. \par \par Discussed role of nsaids in pain management and all relevant risks, if patient is continuing to require after 4 weeks the patient should f/u for alternative treatment. \par Instructed patient to maintain pain diary to monitor pain level, mobility, and function.\par \par I explained to patient benefits and limitation of TeleMedicine visits\par \par Patient understands that limitations include inability to perform comprehensive physical exam, which may lead to potential diagnostic inconsistencies.  \par \par Patient understands that diagnosis and treatment may be limited by these inconsistencies and patient agrees to proceed with care plan\par \par \par \par

## 2021-02-26 ENCOUNTER — RESULT REVIEW (OUTPATIENT)
Age: 41
End: 2021-02-26

## 2021-03-01 ENCOUNTER — APPOINTMENT (OUTPATIENT)
Dept: PAIN MANAGEMENT | Facility: HOSPITAL | Age: 41
End: 2021-03-01

## 2021-03-01 ENCOUNTER — RESULT REVIEW (OUTPATIENT)
Age: 41
End: 2021-03-01

## 2021-03-05 ENCOUNTER — RESULT REVIEW (OUTPATIENT)
Age: 41
End: 2021-03-05

## 2021-03-08 ENCOUNTER — APPOINTMENT (OUTPATIENT)
Dept: PAIN MANAGEMENT | Facility: HOSPITAL | Age: 41
End: 2021-03-08

## 2021-03-08 ENCOUNTER — RESULT REVIEW (OUTPATIENT)
Age: 41
End: 2021-03-08

## 2021-03-12 ENCOUNTER — RESULT REVIEW (OUTPATIENT)
Age: 41
End: 2021-03-12

## 2021-03-15 ENCOUNTER — RESULT REVIEW (OUTPATIENT)
Age: 41
End: 2021-03-15

## 2021-03-15 ENCOUNTER — APPOINTMENT (OUTPATIENT)
Dept: PAIN MANAGEMENT | Facility: HOSPITAL | Age: 41
End: 2021-03-15

## 2021-03-23 ENCOUNTER — RESULT REVIEW (OUTPATIENT)
Age: 41
End: 2021-03-23

## 2021-03-24 DIAGNOSIS — R92.8 OTHER ABNORMAL AND INCONCLUSIVE FINDINGS ON DIAGNOSTIC IMAGING OF BREAST: ICD-10-CM

## 2021-03-25 ENCOUNTER — APPOINTMENT (OUTPATIENT)
Dept: PAIN MANAGEMENT | Facility: CLINIC | Age: 41
End: 2021-03-25
Payer: COMMERCIAL

## 2021-03-25 ENCOUNTER — RESULT REVIEW (OUTPATIENT)
Age: 41
End: 2021-03-25

## 2021-03-25 PROCEDURE — 99214 OFFICE O/P EST MOD 30 MIN: CPT | Mod: 95

## 2021-03-25 NOTE — ASSESSMENT
[FreeTextEntry1] : \par Significant component of axial back pain secondary to lumbar spondylosis and facet arthropathy demonstrated on MRI LS.  Pain refractory to conservative treatments.  s/p BILATERAL L3-sacral ala diagnostic medial branch block  with 100% improvement of pain for 48 hours.\par \par Given significant relief from diagnostic lumbar medial branch block of >80%, and continued lumbar facet arthropathy pain and axial back pain, s/p LEFT then RIGHT  L3-sacral ala medial branch radiofrequency ablation with sustained improvement\par \par may consider repeating intervention\par \par may consider PT for lower back pain\par \par trial tizanidine prn spasm/pain \par \par will avoid steroid based treatments in proximity to covid19 vaccination\par \par Progressive knee pain secondary to osteoarthritis refractory to conservative treatments, s/p LEFT and RIGHT knee steroid injection with transient improvement\par \par \par Right knee pain secondary to arthritis refractory to conservative treatments and steroid injection.  Significantly debilitating to patient.  s/p right knee Viscosupplementation injection x 3 \par \par LEFT knee pain secondary to arthritis refractory to conservative treatments and steroid injection.  Significantly debilitating to patient.  Will schedule LEFT knee Viscosupplementation injection x 3 \par \par r/b/a discussed\par \par Patient is scheduled for procedure based on history, imaging and limited physical exam performed on TeleHealth visit.  If necessary, additional focal physical exam will be performed on date of procedure\par \par Will schedule above interventional pain procedure because further delay may cause harm or negative outcome to patient.  The goal in performing this procedure is to avoid deterioration of function, emergency room visits (which increases exposure) and reliance on opioids.  \par \par r/b/a discussed with patient, lack of evidence to conclusively determine whether pain management procedures have any positive or negative impact on the possibility of camila the virus and/or development of any sequelae. \par \par Patient counselled regarding timing steroid based intervention 2 weeks before or after COVID-19 vaccine administration to avoid any interaction or affect on efficacy of vaccination\par \par Patient demonstrates understanding\par \par Informed patient that risks associated with the COVID-19 infection.  Informed patient steps taken to limit the risks.  We are implementing safety precautions and following protocols consistent with the CDC and state recommendations. All patients and staff will be checked for fever or signs of illness upon entry to the facility. We will limit our steroid dose to the lowest effective therapeutic dose or in some cases steroids will not be injected at all. \par \par Patient agrees to proceed\par \par \par There were no barriers to communication.\par Informed patient that I would be available for any additional questions.\par Patient was instructed to call with any worsening symptoms including severe pain, new numbness/weakness, or changes in the bowel/bladder function. \par \par Discussed role of nsaids in pain management and all relevant risks, if patient is continuing to require after 4 weeks the patient should f/u for alternative treatment. \par Instructed patient to maintain pain diary to monitor pain level, mobility, and function.\par \par I explained to patient benefits and limitation of TeleMedicine visits\par \par Patient understands that limitations include inability to perform comprehensive physical exam, which may lead to potential diagnostic inconsistencies.  \par \par Patient understands that diagnosis and treatment may be limited by these inconsistencies and patient agrees to proceed with care plan\par \par \par \par

## 2021-03-25 NOTE — PHYSICAL EXAM
[de-identified] : \par Constitutional: Normal, well developed, no acute distress on audio/video examination\par Eyes: Symmetric, External structures on video examination\par ENT: Lips, mucosa and tongue normal on video examination\par Oropharynx: Lips normal, symmetric, no external lesions appreciated appreciated on video examination\par Respiratory: Non-labored breathing, no audible wheezes appreciated on audio/video examination\par Vascular: No cyanosis appreciated or edema appreciated on video examination\par GI:  no jaundice appreciated on video examination\par Neurovascular: CN grossly intact on video/audio examination, alert\par MSK: Normal muscle bulk on video examination\par

## 2021-03-25 NOTE — HISTORY OF PRESENT ILLNESS
[Back Pain] : back pain [___ yrs] : [unfilled] year(s) ago [5] : an average pain level of 5/10 [2] : a minimum pain level of 2/10 [10] : a maximum pain level of 10/10 [Aching] : aching [Home] : at home, [unfilled] , at the time of the visit. [Medical Office: ([facility])___] : at the medical office located in  [Verbal consent obtained from patient] : the patient, [unfilled] [FreeTextEntry1] : Interval Note:\par \par s/p  right knee Viscosupplementation injection completed 3/21 with significnat improvement in right knee pain.  Unfortunately the patient started having bilateral lower back pain over the last few days without inciting event.   Pain is so bad that patient finds it difficult to perform adls and ambulate.  She is anxious because she is going on her vacation in the next few days.  Of note she also just completed her COVID19 vaccination process.\par \par denies any worsening numbness, weakness, bowel/bladder dysfunction\par \par \par HPI\par \par Ms. EBER FIGUEROA is a 40 year F with pmhx of htn present with many years of bilateral back pain without inciting event.  It is axial and worse with transition.  Affects her ability to care for her children.  denies any worsening numbness, weakness, bowel/bladder dysfunction\par \par \par \par Previous and current pain medications/doses/effects:\par \par Advil without improvement\par \par Previous Pain Treatments:\par \par PT, Osteopathic treatment, Chiropractic care without improvement\par \par Previous Pain Injections:\par \par  left knee steroid injection 1/4/2021\par  right knee steroid injection 12/28/2020\par  LEFT then RIGHT  L3-sacral ala medial branch radiofrequency ablation completed 8/7/2020\par s/p bilateral L3-sacral ala medial branch block with 100% improvement in axial back pain 7/9/2020\par STEPHANI 1 year ago with mild improvement\par \par Previous Diagnostic Studies/Images:\par \par XR BL Knee 12/2020\par \par Minimal productive change at the lateral joint margin of each knee with the femoral\par tibial joint spaces maintained. There are bilateral patellar spurs evident with mild DJD of the\par right femoral patella joint space at the lateral margin.\par \par  No evidence of suprapatellar effusion. No evidence of fracture or suspicious lesion.\par \par \par  IMPRESSION: Mild degenerative changes as above.\par \par MRI LS 5/2018\par \par L5-S1 degenerative disc narrowing and endplate arthritis\par \par L5-S1 disc bulge spondylosis. No focal disc herniation. Mild bilateral degenerative frontal stenosis secondary to lateral degenerative changes. Mild facet arthritis.\par \par  [FreeTextEntry3] : per pt everything  [FreeTextEntry4] : per pt n/a

## 2021-04-08 ENCOUNTER — APPOINTMENT (OUTPATIENT)
Dept: OBGYN | Facility: CLINIC | Age: 41
End: 2021-04-08
Payer: COMMERCIAL

## 2021-04-08 ENCOUNTER — NON-APPOINTMENT (OUTPATIENT)
Age: 41
End: 2021-04-08

## 2021-04-08 VITALS
BODY MASS INDEX: 43.43 KG/M2 | HEIGHT: 61 IN | SYSTOLIC BLOOD PRESSURE: 122 MMHG | WEIGHT: 230 LBS | DIASTOLIC BLOOD PRESSURE: 84 MMHG

## 2021-04-08 DIAGNOSIS — Z01.419 ENCOUNTER FOR GYNECOLOGICAL EXAMINATION (GENERAL) (ROUTINE) W/OUT ABNORMAL FINDINGS: ICD-10-CM

## 2021-04-08 PROCEDURE — 99072 ADDL SUPL MATRL&STAF TM PHE: CPT

## 2021-04-08 PROCEDURE — 99396 PREV VISIT EST AGE 40-64: CPT

## 2021-04-08 NOTE — PLAN
[FreeTextEntry1] : Discussed benign gyn exam findings. s/p TLH/sling for CHERELLE/prolapse in Sept 2020. Since then no VB or pelvic complaints and CHERELLE symptoms improved. Up to date on annual breast cancer screening, pt made aware of recommendation for diagnostic MMG in 12 months. Discussed possible causes of decreased libido. RTC 1 yr for GYN annual.

## 2021-04-08 NOTE — PHYSICAL EXAM
[Appropriately responsive] : appropriately responsive [Alert] : alert [No Acute Distress] : no acute distress [No Lymphadenopathy] : no lymphadenopathy [No Murmurs] : no murmurs [Soft] : soft [Non-tender] : non-tender [Non-distended] : non-distended [No Lesions] : no lesions [Oriented x3] : oriented x3 [Examination Of The Breasts] : a normal appearance [No Masses] : no breast masses were palpable [Labia Majora] : normal [Labia Minora] : normal [Normal] : normal [No Bleeding] : There was no active vaginal bleeding [Absent] : absent [Uterine Adnexae] : normal

## 2021-04-08 NOTE — HISTORY OF PRESENT ILLNESS
[FreeTextEntry1] : 39 yo P3 here today for annual GYN exam\par \par s/p TLH/sling with Urogyn in Sept 2020 for CHERELLE/prolapse. Since then reports urinary issues much improved. Has been able to exercise more. Since surgery, no vaginal bleeding. Denies any pelvic pain or abnormal vaginal discharge. Sexually active with same partner of many years. Does c/o decreased libido for past year. Has some vaginal dryness but manageable and is going to attempt use of lubrication. Denies starting any new medications or increasing dose of any existing medications.\par \par MMG 3/23/21: BI-RADS 0, R US 3/25/21: BI-RADS 3, a follow up diagnostic MMG recommended in 1 year\par Pap 2018 normal\par \par Denies depression/anxiety. Takes citalopram for history of anxiety but will talk to prescribing physician to consider weaning off as pt feels minimal anxiety these days.

## 2021-04-09 ENCOUNTER — RESULT REVIEW (OUTPATIENT)
Age: 41
End: 2021-04-09

## 2021-04-16 ENCOUNTER — RESULT REVIEW (OUTPATIENT)
Age: 41
End: 2021-04-16

## 2021-04-19 ENCOUNTER — APPOINTMENT (OUTPATIENT)
Dept: PAIN MANAGEMENT | Facility: HOSPITAL | Age: 41
End: 2021-04-19

## 2021-04-19 ENCOUNTER — RESULT REVIEW (OUTPATIENT)
Age: 41
End: 2021-04-19

## 2021-04-23 ENCOUNTER — RESULT REVIEW (OUTPATIENT)
Age: 41
End: 2021-04-23

## 2021-04-23 ENCOUNTER — RX RENEWAL (OUTPATIENT)
Age: 41
End: 2021-04-23

## 2021-04-26 ENCOUNTER — RESULT REVIEW (OUTPATIENT)
Age: 41
End: 2021-04-26

## 2021-04-26 ENCOUNTER — APPOINTMENT (OUTPATIENT)
Dept: PAIN MANAGEMENT | Facility: HOSPITAL | Age: 41
End: 2021-04-26
Payer: COMMERCIAL

## 2021-04-26 PROCEDURE — 20611 DRAIN/INJ JOINT/BURSA W/US: CPT | Mod: LT

## 2021-04-28 ENCOUNTER — APPOINTMENT (OUTPATIENT)
Dept: INTERNAL MEDICINE | Facility: CLINIC | Age: 41
End: 2021-04-28
Payer: COMMERCIAL

## 2021-04-28 ENCOUNTER — RX RENEWAL (OUTPATIENT)
Age: 41
End: 2021-04-28

## 2021-04-28 VITALS
HEIGHT: 61 IN | SYSTOLIC BLOOD PRESSURE: 120 MMHG | DIASTOLIC BLOOD PRESSURE: 70 MMHG | WEIGHT: 240 LBS | BODY MASS INDEX: 45.31 KG/M2

## 2021-04-28 PROCEDURE — 99213 OFFICE O/P EST LOW 20 MIN: CPT

## 2021-04-28 PROCEDURE — 99072 ADDL SUPL MATRL&STAF TM PHE: CPT

## 2021-04-28 NOTE — HISTORY OF PRESENT ILLNESS
[FreeTextEntry1] : Followup hypertension [de-identified] : 40-year-old female presents for followup hypertension, 2 days ago while in pain management office her blood pressure was elevated. Denies chest pain shortness of breath palpitations dizziness, patient states she felt anxious about getting knee injection\par Patient also asking about discontinuing citalopram. She started it when her kids were babies and has been on it since but does not feel she needs it anymore

## 2021-04-28 NOTE — PLAN
VTE: SDCs for now (may be going to IR procedure tomorrow)  Diet: regular  Dispo: PT    ISA Cannon [id number]  Internal Medicine Department  Pager: [phone] / [phone] [FreeTextEntry1] : 40-year-old female is noted presents for followup elevated blood pressure reading while in pain management office. Reading in the office today is well within normal limits, reassured patient.\par \par Also discussed tapering off citalopram, she will decrease to 10 mg for one month then discontinue. If she has any issues with tapering/discontinuing medication she will return to office

## 2021-04-30 ENCOUNTER — RESULT REVIEW (OUTPATIENT)
Age: 41
End: 2021-04-30

## 2021-05-03 ENCOUNTER — RESULT REVIEW (OUTPATIENT)
Age: 41
End: 2021-05-03

## 2021-05-03 ENCOUNTER — APPOINTMENT (OUTPATIENT)
Dept: PAIN MANAGEMENT | Facility: HOSPITAL | Age: 41
End: 2021-05-03

## 2021-05-06 ENCOUNTER — TRANSCRIPTION ENCOUNTER (OUTPATIENT)
Age: 41
End: 2021-05-06

## 2021-05-29 ENCOUNTER — RX RENEWAL (OUTPATIENT)
Age: 41
End: 2021-05-29

## 2021-06-11 ENCOUNTER — APPOINTMENT (OUTPATIENT)
Dept: OBGYN | Facility: CLINIC | Age: 41
End: 2021-06-11

## 2021-06-28 ENCOUNTER — APPOINTMENT (OUTPATIENT)
Dept: GASTROENTEROLOGY | Facility: CLINIC | Age: 41
End: 2021-06-28
Payer: COMMERCIAL

## 2021-06-28 VITALS
DIASTOLIC BLOOD PRESSURE: 70 MMHG | BODY MASS INDEX: 43.43 KG/M2 | TEMPERATURE: 98 F | HEIGHT: 61 IN | WEIGHT: 230 LBS | OXYGEN SATURATION: 98 % | HEART RATE: 74 BPM | SYSTOLIC BLOOD PRESSURE: 112 MMHG

## 2021-06-28 PROCEDURE — 99204 OFFICE O/P NEW MOD 45 MIN: CPT

## 2021-06-28 RX ORDER — CIPROFLOXACIN HYDROCHLORIDE 250 MG/1
250 TABLET, FILM COATED ORAL TWICE DAILY
Qty: 6 | Refills: 0 | Status: DISCONTINUED | COMMUNITY
Start: 2020-09-07 | End: 2021-06-28

## 2021-06-28 RX ORDER — METFORMIN ER 500 MG 500 MG/1
500 TABLET ORAL DAILY
Qty: 90 | Refills: 0 | Status: DISCONTINUED | COMMUNITY
Start: 2020-05-08 | End: 2021-06-28

## 2021-06-28 NOTE — ASSESSMENT
[FreeTextEntry1] : Will plan on an upper endoscopy for GERD, early satiety.  Explained risks/benefits/alternatives including not limited to bleeding, infection, perforation, missed lesions, anesthesia complications.  Patient understands and agrees, all questions answered.\par \par Will start famotidine 20mg twice daily.\par \par Thank you for referring Ms. FIGUEROA.  Please do not hesitate to call to further discuss his/her care.\par \par Note faxed to requesting MD.\par \par

## 2021-06-28 NOTE — HISTORY OF PRESENT ILLNESS
[FreeTextEntry1] : Ms. Hodge is a pleasant 40F h/o obesity, ROBERTH, HTN, DVT who is referred by KAYLYNN Mccoy for GERD.\par \par States she has had bad heartburn for at least 15 years, she will take a two week course of OTC omeprazole with symptom cessation, however will recur almost immediately after finishing her medication course.\par \par No dysphagia or odynophagia.\par \par Gained significant weight around 8 years ago, has had trouble losing the weight.  Does not associate the weight change with her symptoms.\par \mari Does have a long h/o early satiety, needs to eat small meals throughout the day.  She will not be able to eat a large meal due to her early satiety.\par \par Has never had an EGD.\par \par Normal brown BM daily.\par \par Does not smoke or drink.\par \par No FHx of any GI malignancies.

## 2021-06-30 ENCOUNTER — APPOINTMENT (OUTPATIENT)
Dept: INTERNAL MEDICINE | Facility: CLINIC | Age: 41
End: 2021-06-30
Payer: COMMERCIAL

## 2021-06-30 VITALS
SYSTOLIC BLOOD PRESSURE: 104 MMHG | WEIGHT: 236 LBS | DIASTOLIC BLOOD PRESSURE: 70 MMHG | HEIGHT: 61 IN | BODY MASS INDEX: 44.56 KG/M2

## 2021-06-30 PROCEDURE — 99396 PREV VISIT EST AGE 40-64: CPT | Mod: 25

## 2021-06-30 PROCEDURE — 36415 COLL VENOUS BLD VENIPUNCTURE: CPT

## 2021-06-30 PROCEDURE — 99072 ADDL SUPL MATRL&STAF TM PHE: CPT

## 2021-06-30 NOTE — HISTORY OF PRESENT ILLNESS
[FreeTextEntry1] : Annual exam [de-identified] : 40-year-old obese female presents for annual exam. Had surgery in September with uro-gynecologist with good results, she is able to be more active but still has knee pain etc. denies chest pain shortness of breath palpitations dizziness\par \par Complaining of increase in reflux which is persistent, she saw Dr. Garcia who scheduled an upper endoscopy and discussed weight loss with her and possible medication for same. We discussed options at length and I referred her to Dr. Mayur Eckert. She's had lifelong difficult with her weight, never able to sustain weight loss and developing other symptoms due to her obesity i.e. reflux, arthritis, HTN, DVT etc.\par \par Up to date with screenings

## 2021-06-30 NOTE — PLAN
[FreeTextEntry1] : Weight 1-year-old morbidly obese female presents for annual exam, feels well except for aches and pains from arthritis but urinary incontinence is resolved since surgery in September which she is very happy about. Reflux has gotten worse the past couple of months, she is scheduled for an upper endoscopy.\par Reviewed diet and exercise\par We discussed her weight and options at length, once lab work results are available we will discuss medication but I think her ultimately her success lies in bariatric surgery which she agrees with, referred to Dr. Mayur Eckert\par Denies chest pain shortness of breath palpitations dizziness\par \par Up-to-date with screenings\par Call next week to review labs

## 2021-06-30 NOTE — PHYSICAL EXAM
[Obese] : patient was observed to be obese [Normal Female:] : bladder was normal on palpation [Normal] : normal gait, coordination grossly intact, no focal deficits [de-identified] : BMI 44.59 [FreeTextEntry1] : Deferred GI/GYN [de-identified] : Deferred GYN; Denies pain, lumps and discharge [de-identified] : No lymphadenopathy [de-identified] : Denies excessive thirst, urination, fatigue [de-identified] : Alert and Oriented x3.  Appropriate mood and affect [de-identified] : No rash or skin lesion

## 2021-06-30 NOTE — COUNSELING
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

## 2021-06-30 NOTE — HEALTH RISK ASSESSMENT
[Very Good] : ~his/her~  mood as very good [Patient reported mammogram was normal] : Patient reported mammogram was normal [Patient declined PAP Smear] : Patient declined PAP Smear [Patient declined bone density test] : Patient declined bone density test [Patient declined colonoscopy] : Patient declined colonoscopy [HIV test declined] : HIV test declined [Hepatitis C test declined] : Hepatitis C test declined [PapSmearComments] : hysterectomy  [MammogramDate] : 03/21

## 2021-07-01 LAB
ALBUMIN SERPL ELPH-MCNC: 4.1 G/DL
ALP BLD-CCNC: 100 U/L
ALT SERPL-CCNC: 19 U/L
ANION GAP SERPL CALC-SCNC: 12 MMOL/L
APPEARANCE: ABNORMAL
AST SERPL-CCNC: 23 U/L
BASOPHILS # BLD AUTO: 0.09 K/UL
BASOPHILS NFR BLD AUTO: 0.9 %
BILIRUB SERPL-MCNC: 0.4 MG/DL
BILIRUBIN URINE: NEGATIVE
BLOOD URINE: NEGATIVE
BUN SERPL-MCNC: 6 MG/DL
CALCIUM SERPL-MCNC: 9.8 MG/DL
CHLORIDE SERPL-SCNC: 97 MMOL/L
CHOLEST SERPL-MCNC: 185 MG/DL
CO2 SERPL-SCNC: 26 MMOL/L
COLOR: NORMAL
CREAT SERPL-MCNC: 0.77 MG/DL
EOSINOPHIL # BLD AUTO: 0.35 K/UL
EOSINOPHIL NFR BLD AUTO: 3.6 %
ESTIMATED AVERAGE GLUCOSE: 126 MG/DL
FOLATE SERPL-MCNC: 6.7 NG/ML
GLUCOSE QUALITATIVE U: NEGATIVE
GLUCOSE SERPL-MCNC: 114 MG/DL
HBA1C MFR BLD HPLC: 6 %
HCT VFR BLD CALC: 46.5 %
HDLC SERPL-MCNC: 53 MG/DL
HGB BLD-MCNC: 14.3 G/DL
IMM GRANULOCYTES NFR BLD AUTO: 0.4 %
KETONES URINE: NEGATIVE
LDLC SERPL CALC-MCNC: 100 MG/DL
LEUKOCYTE ESTERASE URINE: ABNORMAL
LYMPHOCYTES # BLD AUTO: 2.75 K/UL
LYMPHOCYTES NFR BLD AUTO: 28.2 %
MAN DIFF?: NORMAL
MCHC RBC-ENTMCNC: 26.3 PG
MCHC RBC-ENTMCNC: 30.8 GM/DL
MCV RBC AUTO: 85.6 FL
MONOCYTES # BLD AUTO: 0.6 K/UL
MONOCYTES NFR BLD AUTO: 6.2 %
NEUTROPHILS # BLD AUTO: 5.91 K/UL
NEUTROPHILS NFR BLD AUTO: 60.7 %
NITRITE URINE: NEGATIVE
NONHDLC SERPL-MCNC: 132 MG/DL
PH URINE: 7
PLATELET # BLD AUTO: 360 K/UL
POTASSIUM SERPL-SCNC: 4.5 MMOL/L
PROT SERPL-MCNC: 6.8 G/DL
PROTEIN URINE: NEGATIVE
RBC # BLD: 5.43 M/UL
RBC # FLD: 13.9 %
SODIUM SERPL-SCNC: 135 MMOL/L
SPECIFIC GRAVITY URINE: 1.01
T4 FREE SERPL-MCNC: 0.9 NG/DL
TRIGL SERPL-MCNC: 160 MG/DL
TSH SERPL-ACNC: 0.95 UIU/ML
UROBILINOGEN URINE: NORMAL
VIT B12 SERPL-MCNC: 421 PG/ML
WBC # FLD AUTO: 9.74 K/UL

## 2021-07-12 ENCOUNTER — APPOINTMENT (OUTPATIENT)
Dept: SURGERY | Facility: CLINIC | Age: 41
End: 2021-07-12
Payer: COMMERCIAL

## 2021-07-12 VITALS
WEIGHT: 237.87 LBS | HEIGHT: 61 IN | OXYGEN SATURATION: 99 % | BODY MASS INDEX: 44.91 KG/M2 | SYSTOLIC BLOOD PRESSURE: 149 MMHG | HEART RATE: 72 BPM | DIASTOLIC BLOOD PRESSURE: 90 MMHG | RESPIRATION RATE: 18 BRPM

## 2021-07-12 DIAGNOSIS — Z86.718 PERSONAL HISTORY OF OTHER VENOUS THROMBOSIS AND EMBOLISM: ICD-10-CM

## 2021-07-12 DIAGNOSIS — K21.9 GASTRO-ESOPHAGEAL REFLUX DISEASE W/OUT ESOPHAGITIS: ICD-10-CM

## 2021-07-12 PROCEDURE — 99072 ADDL SUPL MATRL&STAF TM PHE: CPT

## 2021-07-12 PROCEDURE — 99204 OFFICE O/P NEW MOD 45 MIN: CPT

## 2021-07-12 NOTE — HISTORY OF PRESENT ILLNESS
[de-identified] : EBER FIGUEROA is a 40 year F with severe obesity who is interested in learning about bariatric surgery.  She has tried many diets in the past including Weight Watchers, Slim Fast and Noom  with only limited success.\par

## 2021-07-12 NOTE — PLAN
[FreeTextEntry1] : We discussed the importance of regular visits and change in habits.  We discussed surgical and non-surgical options and that non surgical approaches are unlikely to lead to long term, sustained weight loss. We also discussed that surgery alone is unlikely to be successful but should rather be seen as a tool for weight loss to be integrated with physical activity and nutritional counseling. The patient verbalized understanding and agrees to proceed with the evaluation.\par \par Explained to patient that gastric bypass is a better procedure for the treatment of reflux, however, there are many causes to reflux and we will wait and see the results of her EGD.\par \par Begin medically supervised weight program.  Encouraged exercise.\par \par Will need the following tests prior to surgery:\par \par -bariatric blood work (will give Rx for balance on next visit)\par -chest Xray\par -EKG\par -nutrition evaluation\par -psychology evaluation\par -upper endoscopy\par -PMD letter\par -Seminar

## 2021-07-19 ENCOUNTER — APPOINTMENT (OUTPATIENT)
Dept: RHEUMATOLOGY | Facility: CLINIC | Age: 41
End: 2021-07-19
Payer: COMMERCIAL

## 2021-07-19 VITALS
WEIGHT: 236 LBS | SYSTOLIC BLOOD PRESSURE: 140 MMHG | BODY MASS INDEX: 44.56 KG/M2 | DIASTOLIC BLOOD PRESSURE: 78 MMHG | HEIGHT: 61 IN

## 2021-07-19 PROCEDURE — 99072 ADDL SUPL MATRL&STAF TM PHE: CPT

## 2021-07-19 PROCEDURE — 36415 COLL VENOUS BLD VENIPUNCTURE: CPT

## 2021-07-19 PROCEDURE — 99204 OFFICE O/P NEW MOD 45 MIN: CPT | Mod: 25

## 2021-07-20 LAB
ALBUMIN SERPL ELPH-MCNC: 4.2 G/DL
ALP BLD-CCNC: 110 U/L
ALT SERPL-CCNC: 19 U/L
ANION GAP SERPL CALC-SCNC: 16 MMOL/L
AST SERPL-CCNC: 22 U/L
BASOPHILS # BLD AUTO: 0.1 K/UL
BASOPHILS NFR BLD AUTO: 0.8 %
BILIRUB SERPL-MCNC: 0.2 MG/DL
BUN SERPL-MCNC: 9 MG/DL
CALCIUM SERPL-MCNC: 10 MG/DL
CCP AB SER IA-ACNC: <8 UNITS
CHLORIDE SERPL-SCNC: 98 MMOL/L
CO2 SERPL-SCNC: 25 MMOL/L
CREAT SERPL-MCNC: 0.75 MG/DL
CRP SERPL-MCNC: 12 MG/L
EOSINOPHIL # BLD AUTO: 0.5 K/UL
EOSINOPHIL NFR BLD AUTO: 4.2 %
ERYTHROCYTE [SEDIMENTATION RATE] IN BLOOD BY WESTERGREN METHOD: 33 MM/HR
FOLATE SERPL-MCNC: 6.4 NG/ML
GLUCOSE SERPL-MCNC: 101 MG/DL
HCT VFR BLD CALC: 45.1 %
HGB BLD-MCNC: 14 G/DL
IMM GRANULOCYTES NFR BLD AUTO: 0.8 %
LYMPHOCYTES # BLD AUTO: 2.68 K/UL
LYMPHOCYTES NFR BLD AUTO: 22.5 %
MAN DIFF?: NORMAL
MCHC RBC-ENTMCNC: 26 PG
MCHC RBC-ENTMCNC: 31 GM/DL
MCV RBC AUTO: 83.8 FL
MONOCYTES # BLD AUTO: 0.68 K/UL
MONOCYTES NFR BLD AUTO: 5.7 %
NEUTROPHILS # BLD AUTO: 7.87 K/UL
NEUTROPHILS NFR BLD AUTO: 66 %
PLATELET # BLD AUTO: 385 K/UL
POTASSIUM SERPL-SCNC: 4.2 MMOL/L
PROT SERPL-MCNC: 7.4 G/DL
RBC # BLD: 5.38 M/UL
RBC # FLD: 13.8 %
RF+CCP IGG SER-IMP: NEGATIVE
RHEUMATOID FACT SER QL: <10 IU/ML
SODIUM SERPL-SCNC: 138 MMOL/L
VIT B12 SERPL-MCNC: 408 PG/ML
WBC # FLD AUTO: 11.92 K/UL

## 2021-07-23 NOTE — HISTORY OF PRESENT ILLNESS
[FreeTextEntry1] : 41 yo female here for evaluation of body pains.\par \par When she was 11 she had lyme disease and was very sick at the time.  She took Doxycycline for 6 weeks, and she felt better.  However, since then, she still had residual effects.  Since then she has had body pain.  Now if anyone touches her, it huts.  SHe has brain fog, difficulty concentrating.  her joint hurt.  She feels tired all the time.\par She has pain in her hands, knees, hips.  Pain comes and goes throughout the day.  Pain is worse at te end of the day.  pain is every day.  SHe took Naproxen sodium and Ibuprofen, but then developed an ulcer (20 years ago).  She does acupuncture, which gives her immediate relief.  She went to an osteopath who did craniosacral therapy.\par \par Her knees hurt.  She had xrays.  Dr. Huggins gave her SOUSA injections, with no benefit.\par \par No rashes.  No oral ulcers.  She used to have Raynauds when she was thinner.\par No dry eyes or mouth.\par \par She has poor sleep.  Her daughter, who has DOwn Syndrome and is 22 now, wakes up in the middle of the night.\par \par c/o night sweats\par She saw Dr. Garcia for acid reflux, and he prescribed Pepcid and ordered an upper endoscopy.\par She saw Dr. Eckert for evaluation for weight loss surgery.

## 2021-07-29 ENCOUNTER — APPOINTMENT (OUTPATIENT)
Dept: BARIATRICS | Facility: CLINIC | Age: 41
End: 2021-07-29
Payer: COMMERCIAL

## 2021-07-29 PROCEDURE — 97802 MEDICAL NUTRITION INDIV IN: CPT | Mod: 95

## 2021-08-02 ENCOUNTER — RX RENEWAL (OUTPATIENT)
Age: 41
End: 2021-08-02

## 2021-08-17 ENCOUNTER — APPOINTMENT (OUTPATIENT)
Dept: SURGERY | Facility: CLINIC | Age: 41
End: 2021-08-17
Payer: COMMERCIAL

## 2021-08-17 VITALS
BODY MASS INDEX: 44.91 KG/M2 | HEART RATE: 69 BPM | SYSTOLIC BLOOD PRESSURE: 140 MMHG | DIASTOLIC BLOOD PRESSURE: 93 MMHG | HEIGHT: 61 IN | OXYGEN SATURATION: 98 % | RESPIRATION RATE: 18 BRPM | WEIGHT: 237.87 LBS

## 2021-08-17 PROCEDURE — 99213 OFFICE O/P EST LOW 20 MIN: CPT

## 2021-08-17 NOTE — PLAN
[FreeTextEntry1] : EBER FIGUEROA is a 40 year female with severe obesity who is interested in weight loss surgery.\par \par Continue with medically supervised visits.\par Encouraged exercise.\par \par Patient requires remaining testing:\par -EGD\par -PMD letter\par -likely schedule next visit\par

## 2021-08-17 NOTE — HISTORY OF PRESENT ILLNESS
[de-identified] : EBER FIGUEROA returns for her preop medically supervised weight loss visit. She has been making some dietary changes by incorporating protein shakes as  meal replacement.  She watched the seminar and is interested in the sleeve.\par

## 2021-08-27 LAB
ANA SER IF-ACNC: NEGATIVE
B BURGDOR AB SER-IMP: NEGATIVE
B BURGDOR IGM PATRN SER IB-IMP: NEGATIVE
B BURGDOR18KD IGG SER QL IB: NORMAL
B BURGDOR23KD IGG SER QL IB: NORMAL
B BURGDOR23KD IGM SER QL IB: NORMAL
B BURGDOR28KD IGG SER QL IB: NORMAL
B BURGDOR30KD IGG SER QL IB: NORMAL
B BURGDOR31KD IGG SER QL IB: NORMAL
B BURGDOR39KD IGG SER QL IB: NORMAL
B BURGDOR39KD IGM SER QL IB: NORMAL
B BURGDOR41KD IGG SER QL IB: PRESENT
B BURGDOR41KD IGM SER QL IB: NORMAL
B BURGDOR45KD IGG SER QL IB: NORMAL
B BURGDOR58KD IGG SER QL IB: PRESENT
B BURGDOR66KD IGG SER QL IB: NORMAL
B BURGDOR93KD IGG SER QL IB: NORMAL
HLA-B27 RELATED AG QL: NEGATIVE

## 2021-09-03 ENCOUNTER — APPOINTMENT (OUTPATIENT)
Dept: RHEUMATOLOGY | Facility: CLINIC | Age: 41
End: 2021-09-03

## 2021-09-10 ENCOUNTER — APPOINTMENT (OUTPATIENT)
Dept: INTERNAL MEDICINE | Facility: CLINIC | Age: 41
End: 2021-09-10
Payer: COMMERCIAL

## 2021-09-10 VITALS
TEMPERATURE: 99.4 F | DIASTOLIC BLOOD PRESSURE: 86 MMHG | SYSTOLIC BLOOD PRESSURE: 126 MMHG | BODY MASS INDEX: 44.75 KG/M2 | WEIGHT: 237 LBS | HEIGHT: 61 IN

## 2021-09-10 DIAGNOSIS — J01.90 ACUTE SINUSITIS, UNSPECIFIED: ICD-10-CM

## 2021-09-10 PROCEDURE — 99213 OFFICE O/P EST LOW 20 MIN: CPT

## 2021-09-10 RX ORDER — TIZANIDINE 2 MG/1
2 TABLET ORAL
Qty: 45 | Refills: 1 | Status: DISCONTINUED | COMMUNITY
Start: 2021-03-25 | End: 2021-09-10

## 2021-09-10 NOTE — REVIEW OF SYSTEMS
[Earache] : earache [Sore Throat] : no sore throat [Nasal Discharge] : nasal discharge [Postnasal Drip] : postnasal drip [Headache] : headache [Negative] : Heme/Lymph

## 2021-09-10 NOTE — PHYSICAL EXAM
[No Acute Distress] : no acute distress [Well Nourished] : well nourished [Well Developed] : well developed [Well-Appearing] : well-appearing [Normal Sclera/Conjunctiva] : normal sclera/conjunctiva [PERRL] : pupils equal round and reactive to light [EOMI] : extraocular movements intact [Normal Outer Ear/Nose] : the outer ears and nose were normal in appearance [Normal Oropharynx] : the oropharynx was normal [No JVD] : no jugular venous distention [No Lymphadenopathy] : no lymphadenopathy [Supple] : supple [Thyroid Normal, No Nodules] : the thyroid was normal and there were no nodules present [No Respiratory Distress] : no respiratory distress  [No Accessory Muscle Use] : no accessory muscle use [Clear to Auscultation] : lungs were clear to auscultation bilaterally [Normal Rate] : normal rate  [Normal S1, S2] : normal S1 and S2 [Regular Rhythm] : with a regular rhythm [No Murmur] : no murmur heard [No Carotid Bruits] : no carotid bruits [No Abdominal Bruit] : a ~M bruit was not heard ~T in the abdomen [No Varicosities] : no varicosities [Pedal Pulses Present] : the pedal pulses are present [No Edema] : there was no peripheral edema [No Palpable Aorta] : no palpable aorta [No Extremity Clubbing/Cyanosis] : no extremity clubbing/cyanosis [Soft] : abdomen soft [Non Tender] : non-tender [Non-distended] : non-distended [No Masses] : no abdominal mass palpated [No HSM] : no HSM [Normal Bowel Sounds] : normal bowel sounds [Normal Posterior Cervical Nodes] : no posterior cervical lymphadenopathy [Normal Anterior Cervical Nodes] : no anterior cervical lymphadenopathy [No CVA Tenderness] : no CVA  tenderness [No Spinal Tenderness] : no spinal tenderness [No Joint Swelling] : no joint swelling [Grossly Normal Strength/Tone] : grossly normal strength/tone [No Rash] : no rash [Coordination Grossly Intact] : coordination grossly intact [No Focal Deficits] : no focal deficits [Normal Gait] : normal gait [Deep Tendon Reflexes (DTR)] : deep tendon reflexes were 2+ and symmetric [Normal Affect] : the affect was normal [Normal Insight/Judgement] : insight and judgment were intact [de-identified] : Both TMs dull, right one erythmatic. Maxillary and frontal sinuses tender to palpation, periorbital edema present

## 2021-09-10 NOTE — PLAN
[FreeTextEntry1] : 40-year-old female as noted with symptoms consistent with sinusitis. She'll continue her usual care of normal saline, Mucinex and given prescription for Zithromax. If symptoms increase or persist return to office

## 2021-09-10 NOTE — HISTORY OF PRESENT ILLNESS
[FreeTextEntry8] : 40-year-old female with long-standing chronic sinus issues, sinus surgery 20 years ago, presents complaining of sinus pain, ear fullness/pain, headache for more than 5 days. Denies fever chills, no cough

## 2021-09-26 ENCOUNTER — RESULT REVIEW (OUTPATIENT)
Age: 41
End: 2021-09-26

## 2021-09-28 ENCOUNTER — RESULT REVIEW (OUTPATIENT)
Age: 41
End: 2021-09-28

## 2021-09-29 ENCOUNTER — APPOINTMENT (OUTPATIENT)
Dept: GASTROENTEROLOGY | Facility: HOSPITAL | Age: 41
End: 2021-09-29

## 2021-10-05 ENCOUNTER — APPOINTMENT (OUTPATIENT)
Dept: SURGERY | Facility: CLINIC | Age: 41
End: 2021-10-05
Payer: COMMERCIAL

## 2021-10-05 ENCOUNTER — NON-APPOINTMENT (OUTPATIENT)
Age: 41
End: 2021-10-05

## 2021-10-05 VITALS
DIASTOLIC BLOOD PRESSURE: 89 MMHG | SYSTOLIC BLOOD PRESSURE: 164 MMHG | HEIGHT: 61 IN | OXYGEN SATURATION: 99 % | HEART RATE: 73 BPM | WEIGHT: 238.54 LBS | BODY MASS INDEX: 45.04 KG/M2 | RESPIRATION RATE: 18 BRPM

## 2021-10-05 PROCEDURE — 99213 OFFICE O/P EST LOW 20 MIN: CPT

## 2021-10-05 NOTE — HISTORY OF PRESENT ILLNESS
[de-identified] : EBER FIGUEROA returns for her preop medically supervised weight loss visit. She has been making some dietary changes by continuing with protein shakes.

## 2021-10-05 NOTE — PLAN
[FreeTextEntry1] : EBER FIGUEROA is a 40 year female with severe obesity who is interested in sleeve gastrectomy.\par \par Continue with medically supervised visits.\par Encouraged exercise.\par \par Will call patient for scheduling.\par

## 2021-10-11 ENCOUNTER — APPOINTMENT (OUTPATIENT)
Dept: RHEUMATOLOGY | Facility: CLINIC | Age: 41
End: 2021-10-11
Payer: COMMERCIAL

## 2021-10-11 VITALS
DIASTOLIC BLOOD PRESSURE: 80 MMHG | HEIGHT: 61 IN | SYSTOLIC BLOOD PRESSURE: 138 MMHG | BODY MASS INDEX: 44.93 KG/M2 | TEMPERATURE: 97.6 F | WEIGHT: 238 LBS

## 2021-10-11 PROCEDURE — 99213 OFFICE O/P EST LOW 20 MIN: CPT

## 2021-10-13 ENCOUNTER — TRANSCRIPTION ENCOUNTER (OUTPATIENT)
Age: 41
End: 2021-10-13

## 2021-10-15 NOTE — HISTORY OF PRESENT ILLNESS
[FreeTextEntry1] : Her body hurts.  Her arms and hips the most.  Her neck and chest also hurt.\par She gets sporadic movements in her legs when lying down at night.  She does not sleep well.\par + fatigue\par Her hands and feet hurt.  She has pain on the bottom of her feet when walking.\par Her brain is foggy and she has poor concentration.

## 2021-10-18 ENCOUNTER — RESULT REVIEW (OUTPATIENT)
Age: 41
End: 2021-10-18

## 2021-10-19 ENCOUNTER — APPOINTMENT (OUTPATIENT)
Dept: SURGERY | Facility: CLINIC | Age: 41
End: 2021-10-19
Payer: COMMERCIAL

## 2021-10-19 VITALS
BODY MASS INDEX: 44.54 KG/M2 | HEIGHT: 61 IN | DIASTOLIC BLOOD PRESSURE: 87 MMHG | RESPIRATION RATE: 18 BRPM | SYSTOLIC BLOOD PRESSURE: 155 MMHG | OXYGEN SATURATION: 86 % | HEART RATE: 80 BPM | WEIGHT: 235.89 LBS

## 2021-10-19 DIAGNOSIS — E66.01 MORBID (SEVERE) OBESITY DUE TO EXCESS CALORIES: ICD-10-CM

## 2021-10-19 PROCEDURE — 99213 OFFICE O/P EST LOW 20 MIN: CPT

## 2021-10-19 NOTE — PHYSICAL EXAM
[Obese, well nourished, in no acute distress] : obese, well nourished, in no acute distress [Normal] : affect appropriate [de-identified] : well healed lap incisions

## 2021-10-19 NOTE — PLAN
[FreeTextEntry1] : EBER FIGUEROA is a 40 year female scheduled for sleeve gastrectomy.\par -preop instructions and diet provided.\par -Discussed with patient the risks of surgery including, but not limited to, bleeding, infection, leak, injury to nearby or remote structures.  The benefits and alternatives to the procedure were also discussed. The online CHARITO program was completed as well.  All additional questions were answered to the patient's satisfaction. History and physical and all testing was reviewed.  The patient understands and wishes to undergo the proposed surgery.  The consent was signed.  PATs were ordered.\par

## 2021-10-20 ENCOUNTER — APPOINTMENT (OUTPATIENT)
Dept: PAIN MANAGEMENT | Facility: CLINIC | Age: 41
End: 2021-10-20

## 2021-10-26 ENCOUNTER — RESULT REVIEW (OUTPATIENT)
Age: 41
End: 2021-10-26

## 2021-11-01 ENCOUNTER — RX RENEWAL (OUTPATIENT)
Age: 41
End: 2021-11-01

## 2021-11-16 ENCOUNTER — APPOINTMENT (OUTPATIENT)
Dept: SURGERY | Facility: CLINIC | Age: 41
End: 2021-11-16

## 2021-11-17 ENCOUNTER — APPOINTMENT (OUTPATIENT)
Dept: SURGERY | Facility: HOSPITAL | Age: 41
End: 2021-11-17

## 2021-11-22 ENCOUNTER — RX RENEWAL (OUTPATIENT)
Age: 41
End: 2021-11-22

## 2021-12-03 ENCOUNTER — RX RENEWAL (OUTPATIENT)
Age: 41
End: 2021-12-03

## 2021-12-13 ENCOUNTER — APPOINTMENT (OUTPATIENT)
Dept: RHEUMATOLOGY | Facility: CLINIC | Age: 41
End: 2021-12-13
Payer: COMMERCIAL

## 2021-12-13 VITALS
SYSTOLIC BLOOD PRESSURE: 138 MMHG | DIASTOLIC BLOOD PRESSURE: 80 MMHG | HEIGHT: 61 IN | WEIGHT: 234 LBS | BODY MASS INDEX: 44.18 KG/M2

## 2021-12-13 PROCEDURE — 99214 OFFICE O/P EST MOD 30 MIN: CPT | Mod: 25

## 2021-12-13 PROCEDURE — 36415 COLL VENOUS BLD VENIPUNCTURE: CPT

## 2021-12-24 NOTE — HISTORY OF PRESENT ILLNESS
[FreeTextEntry1] : She is taking SSz 1 tab BID.  She has not seen a change\par \par She is under a lot of stress.\par \par She has pain all over.  Shoulders and neck are the worst.  Pain comes and goes.  Sometimes its a 1/10, sometimes its a 5/10.\par \par No rashes.
Current some day smoker

## 2022-01-03 ENCOUNTER — RX RENEWAL (OUTPATIENT)
Age: 42
End: 2022-01-03

## 2022-02-08 ENCOUNTER — RX RENEWAL (OUTPATIENT)
Age: 42
End: 2022-02-08

## 2022-02-14 ENCOUNTER — APPOINTMENT (OUTPATIENT)
Dept: RHEUMATOLOGY | Facility: CLINIC | Age: 42
End: 2022-02-14
Payer: COMMERCIAL

## 2022-02-14 VITALS
DIASTOLIC BLOOD PRESSURE: 80 MMHG | HEIGHT: 61 IN | WEIGHT: 234 LBS | BODY MASS INDEX: 44.18 KG/M2 | SYSTOLIC BLOOD PRESSURE: 140 MMHG

## 2022-02-14 DIAGNOSIS — M25.50 PAIN IN UNSPECIFIED JOINT: ICD-10-CM

## 2022-02-14 DIAGNOSIS — G89.29 PAIN IN UNSPECIFIED JOINT: ICD-10-CM

## 2022-02-14 PROCEDURE — 99215 OFFICE O/P EST HI 40 MIN: CPT | Mod: 25

## 2022-02-14 PROCEDURE — 36415 COLL VENOUS BLD VENIPUNCTURE: CPT

## 2022-02-21 PROBLEM — M25.50 CHRONIC PAIN OF MULTIPLE JOINTS: Status: ACTIVE | Noted: 2021-07-23

## 2022-02-21 NOTE — HISTORY OF PRESENT ILLNESS
[FreeTextEntry1] : The last week and a half she has a pain in her neck, shoulders and upper back.  Pain is worse at night.  She does not sleep well t night.\par \par Her joints are not as swollen and painful.  Her hands and toes are better. Her knees still hurt.\par \par She takes SSz 1 tab BID and CYmbalta 30 mg.

## 2022-03-15 ENCOUNTER — APPOINTMENT (OUTPATIENT)
Dept: GASTROENTEROLOGY | Facility: CLINIC | Age: 42
End: 2022-03-15
Payer: COMMERCIAL

## 2022-03-15 VITALS
TEMPERATURE: 97.6 F | SYSTOLIC BLOOD PRESSURE: 118 MMHG | DIASTOLIC BLOOD PRESSURE: 72 MMHG | HEIGHT: 61 IN | HEART RATE: 99 BPM | BODY MASS INDEX: 43.8 KG/M2 | WEIGHT: 232 LBS

## 2022-03-15 DIAGNOSIS — K62.5 HEMORRHAGE OF ANUS AND RECTUM: ICD-10-CM

## 2022-03-15 PROCEDURE — 99214 OFFICE O/P EST MOD 30 MIN: CPT

## 2022-03-15 RX ORDER — AZITHROMYCIN 500 MG/1
500 TABLET, FILM COATED ORAL DAILY
Qty: 1 | Refills: 0 | Status: COMPLETED | COMMUNITY
Start: 2021-09-10 | End: 2022-03-15

## 2022-03-15 RX ORDER — CITALOPRAM HYDROBROMIDE 20 MG/1
20 TABLET, FILM COATED ORAL
Qty: 90 | Refills: 2 | Status: DISCONTINUED | COMMUNITY
Start: 2018-12-21 | End: 2022-03-15

## 2022-03-15 NOTE — ASSESSMENT
[FreeTextEntry1] : Will plan on a colonoscopy for screening, rectal bleeding.  Explained risks/benefits/alternatives including not limited to bleeding, infection, perforation, missed lesions, anesthesia complications.  Patient understands and agrees, all questions answered.  Will use a split dose miralax/gatorade prep with clears the day prior.\par

## 2022-03-15 NOTE — HISTORY OF PRESENT ILLNESS
[FreeTextEntry1] : Ms. Hodge is a pleasant 40F h/o obesity, ROBERTH, HTN, DVT who returns for f/u.\par \par She has been frequently seeing bright red blood, initially on the toilet paper, now staining the toilet water and on top of brown stool for at least 6 weeks, possibly longer.  Stool is brown, no blood mixed with stool. No clots, no dark blood. No pain, no weight change.  Otherwise feels well, no complaints.\par \par Has never had a colonoscopy.\par \par Does not smoke or drink.\par \par No FHx of any GI malignancies.

## 2022-03-23 ENCOUNTER — APPOINTMENT (OUTPATIENT)
Dept: OBGYN | Facility: CLINIC | Age: 42
End: 2022-03-23
Payer: COMMERCIAL

## 2022-03-23 VITALS
SYSTOLIC BLOOD PRESSURE: 120 MMHG | BODY MASS INDEX: 43.99 KG/M2 | WEIGHT: 233 LBS | HEIGHT: 61 IN | DIASTOLIC BLOOD PRESSURE: 70 MMHG

## 2022-03-23 DIAGNOSIS — T81.509A: ICD-10-CM

## 2022-03-23 DIAGNOSIS — N93.8 OTHER SPECIFIED ABNORMAL UTERINE AND VAGINAL BLEEDING: ICD-10-CM

## 2022-03-23 DIAGNOSIS — N93.9 ABNORMAL UTERINE AND VAGINAL BLEEDING, UNSPECIFIED: ICD-10-CM

## 2022-03-23 PROCEDURE — 99213 OFFICE O/P EST LOW 20 MIN: CPT

## 2022-03-24 ENCOUNTER — RESULT REVIEW (OUTPATIENT)
Age: 42
End: 2022-03-24

## 2022-03-25 ENCOUNTER — TRANSCRIPTION ENCOUNTER (OUTPATIENT)
Age: 42
End: 2022-03-25

## 2022-03-25 ENCOUNTER — RESULT REVIEW (OUTPATIENT)
Age: 42
End: 2022-03-25

## 2022-03-28 ENCOUNTER — RESULT REVIEW (OUTPATIENT)
Age: 42
End: 2022-03-28

## 2022-04-06 ENCOUNTER — APPOINTMENT (OUTPATIENT)
Dept: PAIN MANAGEMENT | Facility: CLINIC | Age: 42
End: 2022-04-06
Payer: COMMERCIAL

## 2022-04-06 VITALS
WEIGHT: 233 LBS | DIASTOLIC BLOOD PRESSURE: 78 MMHG | SYSTOLIC BLOOD PRESSURE: 134 MMHG | BODY MASS INDEX: 43.99 KG/M2 | HEIGHT: 61 IN | TEMPERATURE: 97.6 F

## 2022-04-06 PROCEDURE — 99214 OFFICE O/P EST MOD 30 MIN: CPT

## 2022-04-06 NOTE — PHYSICAL EXAM
[Normal muscle bulk without asymmetry] : normal muscle bulk without asymmetry [Facet Tenderness] : facet tenderness [Spine: Flexion to ___ degrees, without pain] : spine: flexion to [unfilled] degrees, without pain [Spurling] : positive Spurling's Test [Normal] : Skin color, texture, turgor normal, no rashes or lesions in the four extremities and back

## 2022-04-06 NOTE — ASSESSMENT
[FreeTextEntry1] : >> Imaging and Other Studies\par \par Patient continues MD directed home exercises throughout 2022 but not experiencing improvement \par \par I personally reviewed the relevant imaging.  Discussed and explained to patient the likely source of pathology and pain.  Questions answered. MRI\par \par back and leg pain likely secondary to lumbar radiculopathy and discogenic pain refractory to conservative treatments including 6 consecutive weeks of home exercises/PT, will obtain MRI LS to evaluate for pathology\par \par may consider PT vs intervention pending eval\par \par Neck and arm pain likely secondary to cervical radiculopathy and discogenic pain vs spondylosis refractory to conservative treatments including 6 consecutive weeks of PT/home exercises, will obtain MRI CS to evaluate for pathology\par \par may consider PT vs intervention pending eval\par \par \par >> Therapy and Other Modalities\par \par \par >> Medications\par  \par \par Progressive knee pain secondary to osteoarthritis refractory to conservative treatments, s/p LEFT and RIGHT knee steroid injection with transient improvement\par \par \par Right knee pain secondary to arthritis refractory to conservative treatments and steroid injection.  Significantly debilitating to patient.  s/p right knee Viscosupplementation injection x 3 \par \par given efficacy of previous intervention that is >30% improvement in pain and improved ability to perform adls, and return of pain despite conservative treatment, will schedule repeat right knee Viscosupplementation injection x 3 \par \par \par LEFT knee pain secondary to arthritis refractory to conservative treatments and steroid injection.  Significantly debilitating to patient.  sp left knee Viscosupplementation injection x 3 \par \par \par >> Interventions\par \par Significant component of axial back pain secondary to lumbar spondylosis and facet arthropathy demonstrated on MRI LS.  Pain refractory to conservative treatments.  s/p BILATERAL L3-sacral ala diagnostic medial branch block  with 100% improvement of pain for 48 hours.\par \par Given significant relief from diagnostic lumbar medial branch block of >80%, and continued lumbar facet arthropathy pain and axial back pain, s/p LEFT then RIGHT  L3-sacral ala medial branch radiofrequency ablation with sustained improvement\par \par given efficacy of previous intervention that is >30% improvement in pain and improved ability to perform adls, and return of pain despite conservative treatment, may consider repeat repeat LEFT then RIGHT  L4-sacral ala medial branch radiofrequency ablation \par \par >> Consults\par \par na\par \par >> Discussion of Risks/Benefits/Alternatives\par \par 	>Regarding any scheduled procedures:\par \par I have discussed in detail with the patient that any interventional pain procedure is associated with potential risks.  The procedure may include an injection of steroids and potentially other medications (local anesthetic and normal saline) into the epidural space or surrounding tissue of the spine.  There are significant risks of this procedure which include and are not limited to infection, bleeding, worsening pain, dural puncture leading to postdural puncture headache, nerve damage, spinal cord injury, paralysis, stroke, and death.  \par \par There is a chance that the procedure does not improve their pain.  \par \par There are risks associated with the steroid being absorbed into the body systemically.  These include dysphoria, difficulty sleeping, mood swings and personality changes.  Premenopausal women may notice an irregularity in her menstrual cycle for 2-3 months following the injection.  Steroids can specifically affect patients with hypertension, diabetes, and peptic ulcers.  The procedure may cause a temporary increase in blood pressure and blood pressure, and may adversely affect a peptic ulcer.  Other, more rare complications, include avascular necrosis of joints, glaucoma and worsening of osteoporosis. \par \par I have discussed the risks of the procedure at length with the patient, and the potential benefits of pain relief.  I have offered alternatives to the procedure.  All questions were answered.  \par \par The patient expressed understanding and wishes to proceed with the procedure.\par \par 	>Regarding COVID19 Pandemic: \par \par Any planned interventional pain procedure are scheduled because further delay may cause harm or negative outcome to patient.  The goal in performing this procedure is to avoid deterioration of function, emergency room visits (which increases exposure) and reliance on opioids.  \par \par r/b/a discussed with patient, lack of evidence to conclusively determine whether pain management procedures have any positive or negative impact on the possibility of camila the virus and/or development of any sequelae. \par \par Patient counselled regarding timing steroid based intervention 2 weeks before or after COVID-19 vaccine administration to avoid any interaction or affect on efficacy of vaccination\par \par Patient demonstrates understanding\par \par Informed patient that risks associated with the COVID-19 infection.  Informed patient steps taken to limit the risks.  We are implementing safety precautions and following protocols consistent with the CDC and state recommendations. All patients and staff will be checked for fever or signs of illness upon entry to the facility. We will limit our steroid dose to the lowest effective therapeutic dose or in some cases steroids will not be injected at all. \par \par Patient agrees to proceed\par \par >> Conclusion\par \par The above diagnosis and treatment plan is medically reasonable and necessary based on the patient encounter \par There were no barriers to communication.\par Informed patient that I would be available for any additional questions.\par Patient was instructed to call with any worsening symptoms including severe pain, new numbness/weakness, or changes in the bowel/bladder function. \par Discussed role of nsaids in pain management and all relevant risks, if patient is continuing to require after 4 weeks the patient should f/u for alternative treatment. \par Instructed patient to maintain pain diary to monitor pain level, mobility, and function.\par \par

## 2022-04-06 NOTE — HISTORY OF PRESENT ILLNESS
[Back Pain] : back pain [___ yrs] : [unfilled] year(s) ago [5] : an average pain level of 5/10 [2] : a minimum pain level of 2/10 [10] : a maximum pain level of 10/10 [Aching] : aching [FreeTextEntry1] : Interval Note:\par \par Patient reports that she has onset of bilateral axial back pain as well as pain on the right back shifting to her right buttock.  In addition, new onset neck pain radiating to right shoulder and lateral arm.  Knee pain also returning R>L.  Pain is so bad that patient finds it difficult to perform adls and ambulate.\par denies any worsening numbness, weakness, bowel/bladder dysfunction\par \par \par HPI\par \par Ms. EBER FIGUEROA is a 41 year F with pmhx of htn present with many years of bilateral back pain without inciting event.  It is axial and worse with transition.  Affects her ability to care for her children.  denies any worsening numbness, weakness, bowel/bladder dysfunction\par \par \par \par Previous and current pain medications/doses/effects:\par \par Advil without improvement\par \par Previous Pain Treatments:\par \par PT, Osteopathic treatment, Chiropractic care without improvement\par \par Previous Pain Injections:\par \par RIGHT & LEFT knee Viscosupplementation injection completed 5/3/21\par  left knee steroid injection 1/4/2021\par  right knee steroid injection 12/28/2020\par  LEFT then RIGHT  L3-sacral ala medial branch radiofrequency ablation completed 8/7/2020\par s/p bilateral L3-sacral ala medial branch block with 100% improvement in axial back pain 7/9/2020\par STEPHANI 1 year ago with mild improvement\par \par Previous Diagnostic Studies/Images:\par \par XR BL Knee 12/2020\par \par Minimal productive change at the lateral joint margin of each knee with the femoral\par tibial joint spaces maintained. There are bilateral patellar spurs evident with mild DJD of the\par right femoral patella joint space at the lateral margin.\par \par  No evidence of suprapatellar effusion. No evidence of fracture or suspicious lesion.\par \par \par  IMPRESSION: Mild degenerative changes as above.\par \par MRI LS 5/2018\par \par L5-S1 degenerative disc narrowing and endplate arthritis\par \par L5-S1 disc bulge spondylosis. No focal disc herniation. Mild bilateral degenerative frontal stenosis secondary to lateral degenerative changes. Mild facet arthritis.\par \par  [FreeTextEntry2] : 15 [FreeTextEntry3] : per pt everything  [FreeTextEntry4] : per pt n/a

## 2022-04-12 ENCOUNTER — RX RENEWAL (OUTPATIENT)
Age: 42
End: 2022-04-12

## 2022-04-16 ENCOUNTER — RESULT REVIEW (OUTPATIENT)
Age: 42
End: 2022-04-16

## 2022-04-19 ENCOUNTER — APPOINTMENT (OUTPATIENT)
Dept: GASTROENTEROLOGY | Facility: HOSPITAL | Age: 42
End: 2022-04-19

## 2022-04-25 ENCOUNTER — RESULT REVIEW (OUTPATIENT)
Age: 42
End: 2022-04-25

## 2022-04-28 ENCOUNTER — TRANSCRIPTION ENCOUNTER (OUTPATIENT)
Age: 42
End: 2022-04-28

## 2022-04-28 ENCOUNTER — APPOINTMENT (OUTPATIENT)
Dept: PAIN MANAGEMENT | Facility: HOSPITAL | Age: 42
End: 2022-04-28

## 2022-05-05 ENCOUNTER — APPOINTMENT (OUTPATIENT)
Dept: OBGYN | Facility: CLINIC | Age: 42
End: 2022-05-05
Payer: COMMERCIAL

## 2022-05-05 ENCOUNTER — APPOINTMENT (OUTPATIENT)
Dept: PAIN MANAGEMENT | Facility: HOSPITAL | Age: 42
End: 2022-05-05

## 2022-05-05 ENCOUNTER — TRANSCRIPTION ENCOUNTER (OUTPATIENT)
Age: 42
End: 2022-05-05

## 2022-05-05 VITALS
DIASTOLIC BLOOD PRESSURE: 80 MMHG | SYSTOLIC BLOOD PRESSURE: 140 MMHG | BODY MASS INDEX: 44.18 KG/M2 | HEIGHT: 61 IN | WEIGHT: 234 LBS

## 2022-05-05 DIAGNOSIS — Z01.419 ENCOUNTER FOR GYNECOLOGICAL EXAMINATION (GENERAL) (ROUTINE) W/OUT ABNORMAL FINDINGS: ICD-10-CM

## 2022-05-05 PROCEDURE — 99396 PREV VISIT EST AGE 40-64: CPT

## 2022-05-06 ENCOUNTER — APPOINTMENT (OUTPATIENT)
Dept: FAMILY MEDICINE | Facility: CLINIC | Age: 42
End: 2022-05-06
Payer: COMMERCIAL

## 2022-05-06 VITALS
HEIGHT: 61 IN | SYSTOLIC BLOOD PRESSURE: 128 MMHG | OXYGEN SATURATION: 100 % | BODY MASS INDEX: 44.56 KG/M2 | DIASTOLIC BLOOD PRESSURE: 72 MMHG | WEIGHT: 236 LBS | HEART RATE: 99 BPM | TEMPERATURE: 99.8 F | RESPIRATION RATE: 18 BRPM

## 2022-05-06 PROCEDURE — 99215 OFFICE O/P EST HI 40 MIN: CPT

## 2022-05-06 RX ORDER — LOSARTAN POTASSIUM AND HYDROCHLOROTHIAZIDE 12.5; 5 MG/1; MG/1
50-12.5 TABLET ORAL DAILY
Qty: 90 | Refills: 2 | Status: COMPLETED | COMMUNITY
Start: 2020-03-13 | End: 2022-05-06

## 2022-05-09 ENCOUNTER — APPOINTMENT (OUTPATIENT)
Dept: RHEUMATOLOGY | Facility: CLINIC | Age: 42
End: 2022-05-09
Payer: COMMERCIAL

## 2022-05-09 ENCOUNTER — RX RENEWAL (OUTPATIENT)
Age: 42
End: 2022-05-09

## 2022-05-09 DIAGNOSIS — M13.80 OTHER SPECIFIED ARTHRITIS, UNSPECIFIED SITE: ICD-10-CM

## 2022-05-09 PROCEDURE — 99212 OFFICE O/P EST SF 10 MIN: CPT | Mod: 95

## 2022-05-10 ENCOUNTER — APPOINTMENT (OUTPATIENT)
Dept: PAIN MANAGEMENT | Facility: CLINIC | Age: 42
End: 2022-05-10
Payer: COMMERCIAL

## 2022-05-10 DIAGNOSIS — M51.26 OTHER INTERVERTEBRAL DISC DISPLACEMENT, LUMBAR REGION: ICD-10-CM

## 2022-05-10 DIAGNOSIS — M47.817 SPONDYLOSIS W/OUT MYELOPATHY OR RADICULOPATHY, LUMBOSACRAL REGION: ICD-10-CM

## 2022-05-10 DIAGNOSIS — M54.12 RADICULOPATHY, CERVICAL REGION: ICD-10-CM

## 2022-05-10 DIAGNOSIS — M17.11 UNILATERAL PRIMARY OSTEOARTHRITIS, RIGHT KNEE: ICD-10-CM

## 2022-05-10 DIAGNOSIS — M79.2 NEURALGIA AND NEURITIS, UNSPECIFIED: ICD-10-CM

## 2022-05-10 DIAGNOSIS — M17.12 UNILATERAL PRIMARY OSTEOARTHRITIS, LEFT KNEE: ICD-10-CM

## 2022-05-10 DIAGNOSIS — M79.18 MYALGIA, OTHER SITE: ICD-10-CM

## 2022-05-10 DIAGNOSIS — G89.4 CHRONIC PAIN SYNDROME: ICD-10-CM

## 2022-05-10 PROCEDURE — 99214 OFFICE O/P EST MOD 30 MIN: CPT | Mod: 95

## 2022-05-10 NOTE — ASSESSMENT
[FreeTextEntry1] : >> Imaging and Other Studies\par \par Patient continues MD directed home exercises throughout 2022 but not experiencing improvement \par \par I personally reviewed the relevant imaging.  Discussed and explained to patient the likely source of pathology and pain.  Questions answered. MRI\par \par \par >> Therapy and Other Modalities\par \par \par >> Medications\par acetaminophen 650mg q8h prn pain (caution <3g daily)\par \par \par \par >> Interventions\par \par May consider cervical epidural steroid injection \par Progressive knee pain secondary to osteoarthritis refractory to conservative treatments, s/p LEFT and RIGHT knee steroid injection with transient improvement\par \par \par Right knee pain secondary to arthritis refractory to conservative treatments and steroid injection.  Significantly debilitating to patient.  s/p right knee Viscosupplementation injection x 3 \par \par given efficacy of previous intervention that is >30% improvement in pain and improved ability to perform adls, and return of pain despite conservative treatment,will complete repeat right knee Viscosupplementation injection x 3 \par \par \par LEFT knee pain secondary to arthritis refractory to conservative treatments and steroid injection.  Significantly debilitating to patient.  sp left knee Viscosupplementation injection x 3 \par \par Significant component of axial back pain secondary to lumbar spondylosis and facet arthropathy demonstrated on MRI LS.  Pain refractory to conservative treatments.  s/p BILATERAL L3-sacral ala diagnostic medial branch block  with 100% improvement of pain for 48 hours.\par \par Given significant relief from diagnostic lumbar medial branch block of >80%, and continued lumbar facet arthropathy pain and axial back pain, s/p LEFT then RIGHT  L3-sacral ala medial branch radiofrequency ablation with sustained improvement\par \par given efficacy of previous intervention that is >80% improvement in pain and improved ability to perform adls, and return of pain despite conservative treatment, may schedule repeat RIGHT then LEFT L4-sacral ala medial branch radiofrequency ablation \par \par >> Consults\par \par na\par \par >> Discussion of Risks/Benefits/Alternatives\par \par 	>Regarding any scheduled procedures:\par \par I have discussed in detail with the patient that any interventional pain procedure is associated with potential risks.  The procedure may include an injection of steroids and potentially other medications (local anesthetic and normal saline) into the epidural space or surrounding tissue of the spine.  There are significant risks of this procedure which include and are not limited to infection, bleeding, worsening pain, dural puncture leading to postdural puncture headache, nerve damage, spinal cord injury, paralysis, stroke, and death.  \par \par There is a chance that the procedure does not improve their pain.  \par \par There are risks associated with the steroid being absorbed into the body systemically.  These include dysphoria, difficulty sleeping, mood swings and personality changes.  Premenopausal women may notice an irregularity in her menstrual cycle for 2-3 months following the injection.  Steroids can specifically affect patients with hypertension, diabetes, and peptic ulcers.  The procedure may cause a temporary increase in blood pressure and blood pressure, and may adversely affect a peptic ulcer.  Other, more rare complications, include avascular necrosis of joints, glaucoma and worsening of osteoporosis. \par \par I have discussed the risks of the procedure at length with the patient, and the potential benefits of pain relief.  I have offered alternatives to the procedure.  All questions were answered.  \par \par The patient expressed understanding and wishes to proceed with the procedure.\par \par 	>Regarding COVID19 Pandemic: \par \par Any planned interventional pain procedure are scheduled because further delay may cause harm or negative outcome to patient.  The goal in performing this procedure is to avoid deterioration of function, emergency room visits (which increases exposure) and reliance on opioids.  \par \par r/b/a discussed with patient, lack of evidence to conclusively determine whether pain management procedures have any positive or negative impact on the possibility of camila the virus and/or development of any sequelae. \par \par Patient counselled regarding timing steroid based intervention 2 weeks before or after COVID-19 vaccine administration to avoid any interaction or affect on efficacy of vaccination\par \par Patient demonstrates understanding\par \par Informed patient that risks associated with the COVID-19 infection.  Informed patient steps taken to limit the risks.  We are implementing safety precautions and following protocols consistent with the CDC and state recommendations. All patients and staff will be checked for fever or signs of illness upon entry to the facility. We will limit our steroid dose to the lowest effective therapeutic dose or in some cases steroids will not be injected at all. \par \par Patient agrees to proceed\par \par >> Conclusion\par \par The above diagnosis and treatment plan is medically reasonable and necessary based on the patient encounter \par There were no barriers to communication.\par Informed patient that I would be available for any additional questions.\par Patient was instructed to call with any worsening symptoms including severe pain, new numbness/weakness, or changes in the bowel/bladder function. \par Discussed role of nsaids in pain management and all relevant risks, if patient is continuing to require after 4 weeks the patient should f/u for alternative treatment. \par Instructed patient to maintain pain diary to monitor pain level, mobility, and function.\par \par I explained to patient benefits and limitation of TeleMedicine visits\par \par Patient understands that limitations include inability to perform comprehensive physical exam, which may lead to potential diagnostic inconsistencies.  \par \par Any scheduled procedures are based on history, imaging and limited physical exam performed on TeleHealth visit.  If necessary, additional focal physical exam will be performed on date of procedure\par \par Patient understands that diagnosis and treatment may be limited by these inconsistencies and patient agrees to proceed with care plan\par \par \par

## 2022-05-10 NOTE — PHYSICAL EXAM
[de-identified] : \par Constitutional: Normal, well developed, no acute distress on audio/video examination\par Eyes: Symmetric, External structures on video examination\par ENT: Lips, mucosa and tongue normal on video examination\par Oropharynx: Lips normal, symmetric, no external lesions appreciated appreciated on video examination\par Respiratory: Non-labored breathing, no audible wheezes appreciated on audio/video examination\par Vascular: No cyanosis appreciated or edema appreciated on video examination\par GI:  no jaundice appreciated on video examination\par Neurovascular: CN grossly intact on video/audio examination, alert\par MSK: Normal muscle bulk on video examination\par

## 2022-05-10 NOTE — HISTORY OF PRESENT ILLNESS
[Back Pain] : back pain [___ yrs] : [unfilled] year(s) ago [5] : an average pain level of 5/10 [2] : a minimum pain level of 2/10 [10] : a maximum pain level of 10/10 [Aching] : aching [Home] : at home, [unfilled] , at the time of the visit. [Medical Office: ([facility])___] : at the medical office located in  [Verbal consent obtained from patient] : the patient, [unfilled] [FreeTextEntry1] : Interval Note:\par \par Patient reports that she has onset of bilateral axial back pain as well as pain on the right back shifting to her right buttock.  In addition, new onset neck pain radiating to right shoulder and lateral arm.  Knee pain also returning R>L.  Pain is so bad that patient finds it difficult to perform adls and ambulate.\par denies any worsening numbness, weakness, bowel/bladder dysfunction\par \par \par HPI\par \par Ms. EBER FIGUEROA is a 41 year F with pmhx of htn present with many years of bilateral back pain without inciting event.  It is axial and worse with transition.  Affects her ability to care for her children.  denies any worsening numbness, weakness, bowel/bladder dysfunction\par \par \par \par Previous and current pain medications/doses/effects:\par \par Advil without improvement\par \par Previous Pain Treatments:\par \par PT, Osteopathic treatment, Chiropractic care without improvement\par \par Previous Pain Injections:\par \par RIGHT & LEFT knee Viscosupplementation injection completed 5/3/21\par  left knee steroid injection 1/4/2021\par  right knee steroid injection 12/28/2020\par  LEFT then RIGHT  L3-sacral ala medial branch radiofrequency ablation completed 8/7/2020\par s/p bilateral L3-sacral ala medial branch block with 100% improvement in axial back pain 7/9/2020\par STEPHANI 1 year ago with mild improvement\par \par Previous Diagnostic Studies/Images:\par \par \par MRI LS 4/22\par \par Cervical lordosis maintained. Cervical vertebral body heights are maintained. No significant\par spondylolisthesis.. Cervical vertebral body bone marrow signal within normal limits. Intervertebral\par disc spaces are maintained. No suspicious expansile or destructive osseous lesion identified.\par Paraspinal soft tissues are within normal limits. Visualized lung apices are unremarkable.\par Visualized vertebral artery flow voids are preserved. No abnormal cord signal identified. No\par significant periarticular or paraspinal soft tissue edema identified.\par \par  There are multilevel findings as follows:\par \par \par  Craniocervical junction unremarkable.\par \par \par \par  C2-C3: No significant canal or foraminal stenosis.\par \par  C3-C4: Central disc protrusion without significant canal or foraminal stenosis\par \par  C4-C5: Posterior osteophytic ridging with eccentric right disc bulging and bilateral\par uncovertebral/facet hypertrophy contributing to mild to moderate canal stenosis and mild to moderate\par bilateral foraminal stenosis, right greater than left.\par \par  C5-C6: Posterior osteophytic ridging with eccentric right disc bulging and bilateral\par uncovertebral/facet hypertrophy contributing to mild to moderate canal stenosis and moderate to\par severe right and mild left foraminal stenosis.\par \par \par  C6-C7: No significant canal or foraminal stenosis.\par \par  C7-T1: No significant canal or foraminal stenosis.\par \par \par \par  IMPRESSION:\par \par  Multilevel cervical spondylitic changes as detailed above most notable for mild to moderate canal\par stenosis at C4-C5, C5-C6 and moderate to severe bilateral foraminal stenosis at C4-C5, C5-C6, right\par greater than left\par \par MRI LS 4/22\par \par \par  When counting inferiorly from craniocervical junction on total spine localizer sequences, there\par appear to be 7 cervical type, 12 thoracic-type and 5 lumbar type vertebral bodies. For purposes of\par this report, vertebral body at level of the iliolumbar ligament will be designated as L5.\par Inferiormost well-formed intervertebral disc space will be designated as L5-S1. No MR evidence for\par transitional lumbosacral anatomy.\par \par \par  Lumbar lordosis is maintained. Mild dextrocurvature centered at lower lumbar spine. No significant\par spondylolisthesis. Vertebral body heights are maintained. Vertebral body bone marrow signal within\par normal limits. No abnormal cord signal identified. There is mild intervertebral disc space narrowing\par and desiccation L4-L5 and L5-S1. Conus terminates at L1-L2. No significant periarticular or\par paraspinal soft tissue edema is identified. No suspicious expansile or destructive osseous lesion\par identified. Paraspinal soft tissues are unremarkable.\par \par  There are multilevel findings as follows:\par \par  T12-L1: No significant canal or foraminal stenosis.\par \par  L1-L2: No significant canal or foraminal stenosis.\par \par  L2-L3: No significant canal or foraminal stenosis.\par \par  L3-L4: No significant canal or foraminal stenosis.\par \par  L4-L5: Disc bulge, bilateral facet arthropathy, ligamentous hypertrophy contributing to no\par significant canal stenosis and mild bilateral foraminal stenosis. Mild bilateral lateral stenosis\par involving descending L5 nerve roots.\par \par  L5-S1: Disc bulge, bilateral facet arthropathy, ligamentous hypertrophy contributing to no\par significant canal stenosis and mild bilateral foraminal stenosis. Mild bilateral lateral recess\par stenosis involving descending S1 nerve roots.\par \par \par \par \par  IMPRESSION:\par \par  Multilevel lumbar spondylitic changes as detailed above notable for mild bilateral foraminal\par stenosis and mild bilateral lateral recess stenosis at L4-L5 and L5-S1, involving exiting L4, L5 ner\par ve roots and descending L5-S1 nerve roots, respectively. No significant canal stenosis of the lumbar\par spine.\par \par \par \par XR BL Knee 12/2020\par \par Minimal productive change at the lateral joint margin of each knee with the femoral\par tibial joint spaces maintained. There are bilateral patellar spurs evident with mild DJD of the\par right femoral patella joint space at the lateral margin.\par \par  No evidence of suprapatellar effusion. No evidence of fracture or suspicious lesion.\par \par \par  IMPRESSION: Mild degenerative changes as above.\par \par MRI LS 5/2018\par \par L5-S1 degenerative disc narrowing and endplate arthritis\par \par L5-S1 disc bulge spondylosis. No focal disc herniation. Mild bilateral degenerative frontal stenosis secondary to lateral degenerative changes. Mild facet arthritis.\par \par  [FreeTextEntry3] : per pt everything  [FreeTextEntry4] : per pt n/a

## 2022-05-10 NOTE — HISTORY OF PRESENT ILLNESS
[FreeTextEntry8] : 41 year old female with morbid obesity presenting for form completion. Patient has been trying to lose weight using many diets and exercise programs including Weight Watchers, Slim Fast and Noom with only limited success. She has been evaluated by the bariatric team and will be scheduled to undergo sleeve gastrectomy. She's seeing Dr. Frida Morgan, bariatric surgeon at [facility]. Patient has forms that needs to be signed stating she has been doing these diets under the care of her previous pcp from our office who recently retired. Patient has no other complaints.

## 2022-05-19 ENCOUNTER — TRANSCRIPTION ENCOUNTER (OUTPATIENT)
Age: 42
End: 2022-05-19

## 2022-05-19 ENCOUNTER — APPOINTMENT (OUTPATIENT)
Dept: PAIN MANAGEMENT | Facility: HOSPITAL | Age: 42
End: 2022-05-19

## 2022-06-02 ENCOUNTER — APPOINTMENT (OUTPATIENT)
Dept: PAIN MANAGEMENT | Facility: HOSPITAL | Age: 42
End: 2022-06-02

## 2022-06-02 ENCOUNTER — RESULT REVIEW (OUTPATIENT)
Age: 42
End: 2022-06-02

## 2022-06-02 ENCOUNTER — TRANSCRIPTION ENCOUNTER (OUTPATIENT)
Age: 42
End: 2022-06-02

## 2022-06-04 ENCOUNTER — RX RENEWAL (OUTPATIENT)
Age: 42
End: 2022-06-04

## 2022-06-09 ENCOUNTER — RESULT REVIEW (OUTPATIENT)
Age: 42
End: 2022-06-09

## 2022-06-09 ENCOUNTER — APPOINTMENT (OUTPATIENT)
Dept: PAIN MANAGEMENT | Facility: HOSPITAL | Age: 42
End: 2022-06-09

## 2022-06-09 ENCOUNTER — TRANSCRIPTION ENCOUNTER (OUTPATIENT)
Age: 42
End: 2022-06-09

## 2022-06-20 ENCOUNTER — RX RENEWAL (OUTPATIENT)
Age: 42
End: 2022-06-20

## 2022-06-22 ENCOUNTER — APPOINTMENT (OUTPATIENT)
Dept: FAMILY MEDICINE | Facility: CLINIC | Age: 42
End: 2022-06-22
Payer: COMMERCIAL

## 2022-06-22 VITALS
SYSTOLIC BLOOD PRESSURE: 152 MMHG | RESPIRATION RATE: 18 BRPM | TEMPERATURE: 98.2 F | HEIGHT: 61 IN | WEIGHT: 234 LBS | BODY MASS INDEX: 44.18 KG/M2 | OXYGEN SATURATION: 100 % | HEART RATE: 81 BPM | DIASTOLIC BLOOD PRESSURE: 102 MMHG

## 2022-06-22 PROCEDURE — 99213 OFFICE O/P EST LOW 20 MIN: CPT

## 2022-06-22 RX ORDER — LEVOCETIRIZINE DIHYDROCHLORIDE 5 MG/1
5 TABLET ORAL
Qty: 30 | Refills: 0 | Status: COMPLETED | COMMUNITY
Start: 2021-05-06 | End: 2022-06-22

## 2022-06-22 RX ORDER — SULFASALAZINE 500 MG/1
500 TABLET, DELAYED RELEASE ORAL
Qty: 60 | Refills: 1 | Status: COMPLETED | COMMUNITY
Start: 2021-10-11 | End: 2022-06-22

## 2022-06-22 RX ORDER — TIZANIDINE 2 MG/1
2 TABLET ORAL
Qty: 45 | Refills: 1 | Status: COMPLETED | COMMUNITY
Start: 2021-10-05 | End: 2022-06-22

## 2022-06-22 RX ORDER — SULFASALAZINE 500 MG/1
500 TABLET ORAL TWICE DAILY
Qty: 60 | Refills: 1 | Status: COMPLETED | COMMUNITY
Start: 2021-12-13 | End: 2022-06-22

## 2022-06-22 NOTE — HISTORY OF PRESENT ILLNESS
[FreeTextEntry8] : 41 year old female with pmh of HTN (on losartan 50mg) presenting with elevated blood pressure. Patient was seen at another facility for preop for gastric sleeve found to have elevated blood pressure of 160/108 and recommended to see pcp. Jerriash reports she's under a lot of stress as her son broke his arm, theyre going on vacation etc. She doesn't have any symptoms of vision changes or heaadache.

## 2022-06-29 ENCOUNTER — APPOINTMENT (OUTPATIENT)
Dept: FAMILY MEDICINE | Facility: CLINIC | Age: 42
End: 2022-06-29

## 2022-06-29 VITALS
HEART RATE: 105 BPM | BODY MASS INDEX: 44.18 KG/M2 | RESPIRATION RATE: 16 BRPM | SYSTOLIC BLOOD PRESSURE: 138 MMHG | HEIGHT: 61 IN | DIASTOLIC BLOOD PRESSURE: 92 MMHG | OXYGEN SATURATION: 98 % | TEMPERATURE: 98.8 F | WEIGHT: 234 LBS

## 2022-06-29 PROCEDURE — 99213 OFFICE O/P EST LOW 20 MIN: CPT

## 2022-06-29 NOTE — HISTORY OF PRESENT ILLNESS
[FreeTextEntry1] : Follow up  [de-identified] : 41 year old female presenting for HTN follow up. Patient was started on losartan 100mg at last visit and blood pressure has improved. Patient reports at home blood pressure is around 119-130/; Previous it was on the 150/90s; She is doing well and trying to eat better. going on vacation this weekend.

## 2022-07-12 ENCOUNTER — APPOINTMENT (OUTPATIENT)
Dept: FAMILY MEDICINE | Facility: CLINIC | Age: 42
End: 2022-07-12

## 2022-07-12 VITALS
SYSTOLIC BLOOD PRESSURE: 138 MMHG | OXYGEN SATURATION: 99 % | RESPIRATION RATE: 17 BRPM | DIASTOLIC BLOOD PRESSURE: 84 MMHG | HEART RATE: 86 BPM | BODY MASS INDEX: 44.4 KG/M2 | WEIGHT: 235 LBS | TEMPERATURE: 97.7 F

## 2022-07-12 PROCEDURE — 99213 OFFICE O/P EST LOW 20 MIN: CPT

## 2022-07-18 ENCOUNTER — APPOINTMENT (OUTPATIENT)
Dept: INTERNAL MEDICINE | Facility: CLINIC | Age: 42
End: 2022-07-18

## 2022-07-18 NOTE — HISTORY OF PRESENT ILLNESS
[FreeTextEntry1] : Follow up  [de-identified] : 41 year old female presenting for HTN follow up. Patient is on losartan 100mg and reports home bp has been controlled as well. She is doing well and trying to eat better.

## 2022-07-21 ENCOUNTER — NON-APPOINTMENT (OUTPATIENT)
Age: 42
End: 2022-07-21

## 2022-07-21 RX ORDER — FAMOTIDINE 20 MG/1
20 TABLET, FILM COATED ORAL TWICE DAILY
Qty: 120 | Refills: 2 | Status: DISCONTINUED | COMMUNITY
Start: 2021-06-28 | End: 2022-07-21

## 2022-07-22 ENCOUNTER — NON-APPOINTMENT (OUTPATIENT)
Age: 42
End: 2022-07-22

## 2022-08-01 ENCOUNTER — APPOINTMENT (OUTPATIENT)
Dept: BARIATRICS | Facility: CLINIC | Age: 42
End: 2022-08-01

## 2022-08-01 VITALS — WEIGHT: 229 LBS | BODY MASS INDEX: 43.23 KG/M2 | HEIGHT: 61 IN

## 2022-08-01 PROCEDURE — 97803 MED NUTRITION INDIV SUBSEQ: CPT | Mod: 95

## 2022-08-06 ENCOUNTER — NON-APPOINTMENT (OUTPATIENT)
Age: 42
End: 2022-08-06

## 2022-08-30 ENCOUNTER — APPOINTMENT (OUTPATIENT)
Dept: FAMILY MEDICINE | Facility: CLINIC | Age: 42
End: 2022-08-30

## 2022-08-30 VITALS
RESPIRATION RATE: 17 BRPM | HEART RATE: 80 BPM | DIASTOLIC BLOOD PRESSURE: 70 MMHG | TEMPERATURE: 98.2 F | BODY MASS INDEX: 40.06 KG/M2 | OXYGEN SATURATION: 100 % | WEIGHT: 212 LBS | SYSTOLIC BLOOD PRESSURE: 108 MMHG

## 2022-08-30 DIAGNOSIS — E55.9 VITAMIN D DEFICIENCY, UNSPECIFIED: ICD-10-CM

## 2022-08-30 PROCEDURE — 99396 PREV VISIT EST AGE 40-64: CPT | Mod: 25

## 2022-08-30 PROCEDURE — 36415 COLL VENOUS BLD VENIPUNCTURE: CPT

## 2022-08-30 PROCEDURE — G0444 DEPRESSION SCREEN ANNUAL: CPT | Mod: NC,59

## 2022-08-30 NOTE — HEALTH RISK ASSESSMENT
[Very Good] : ~his/her~  mood as very good [Never] : Never [No] : In the past 12 months have you used drugs other than those required for medical reasons? No [No falls in past year] : Patient reported no falls in the past year [0] : 2) Feeling down, depressed, or hopeless: Not at all (0) [PHQ-2 Negative - No further assessment needed] : PHQ-2 Negative - No further assessment needed [Retinopathy] : Retinopathy [Patient reported mammogram was normal] : Patient reported mammogram was normal [Patient reported PAP Smear was normal] : Patient reported PAP Smear was normal [Patient reported colonoscopy was normal] : Patient reported colonoscopy was normal [HIV Test offered] : HIV Test offered [Hepatitis C test offered] : Hepatitis C test offered [None] : None [Employed] : employed [] :  [# Of Children ___] : has [unfilled] children [Sexually Active] : sexually active [Fully functional (bathing, dressing, toileting, transferring, walking, feeding)] : Fully functional (bathing, dressing, toileting, transferring, walking, feeding) [Fully functional (using the telephone, shopping, preparing meals, housekeeping, doing laundry, using] : Fully functional and needs no help or supervision to perform IADLs (using the telephone, shopping, preparing meals, housekeeping, doing laundry, using transportation, managing medications and managing finances) [Smoke Detector] : smoke detector [Carbon Monoxide Detector] : carbon monoxide detector [Safety elements used in home] : safety elements used in home [Seat Belt] :  uses seat belt [Sunscreen] : uses sunscreen [Audit-CScore] : 0 [de-identified] : walking and GYM work out  [de-identified] : specific diet due to surgery  [[id number]] : 0 [EyeExamDate] : 07/22 [Change in mental status noted] : No change in mental status noted [Language] : denies difficulty with language [Behavior] : denies difficulty with behavior [Learning/Retaining New Information] : denies difficulty learning/retaining new information [Handling Complex Tasks] : denies difficulty handling complex tasks [Reasoning] : denies difficulty with reasoning [Spatial Ability and Orientation] : denies difficulty with spatial ability and orientation [Reports changes in hearing] : Reports no changes in hearing [Reports changes in vision] : Reports no changes in vision [Reports normal functional visual acuity (ie: able to read med bottle)] : Reports poor functional visual acuity.  [Reports changes in dental health] : Reports no changes in dental health [Guns at Home] : no guns at home [TB Exposure] : is not being exposed to tuberculosis [MammogramDate] : 03/22 [PapSmearDate] : 01/20 [PapSmearComments] : Pt had total hysterectomy  [ColonoscopyDate] : 04/22 [FreeTextEntry2] : work from home

## 2022-08-30 NOTE — ASSESSMENT
[FreeTextEntry1] : 41 year old female presented for an annual physical exam. \par routine blood work today, blood collected in the office.\par up to date with screening\par will call back with results.\par

## 2022-08-30 NOTE — HISTORY OF PRESENT ILLNESS
[FreeTextEntry1] : Pt is here for her yearly exam  [de-identified] : 41 year old female s/p Robotic Sleeve Gastrectomy on 7/18/22, HTN presenting for an annual physical exam. Patient reports she is feeling well, has started exercising and eating small portion at a time. Today, has no other complaints.

## 2022-08-31 LAB
25(OH)D3 SERPL-MCNC: 62.3 NG/ML
ALBUMIN SERPL ELPH-MCNC: 4.1 G/DL
ALP BLD-CCNC: 89 U/L
ALT SERPL-CCNC: 29 U/L
ANION GAP SERPL CALC-SCNC: 13 MMOL/L
AST SERPL-CCNC: 24 U/L
BASOPHILS # BLD AUTO: 0.08 K/UL
BASOPHILS NFR BLD AUTO: 1.1 %
BILIRUB SERPL-MCNC: 0.3 MG/DL
BUN SERPL-MCNC: 7 MG/DL
CALCIUM SERPL-MCNC: 10 MG/DL
CHLORIDE SERPL-SCNC: 102 MMOL/L
CHOLEST SERPL-MCNC: 172 MG/DL
CO2 SERPL-SCNC: 24 MMOL/L
CREAT SERPL-MCNC: 0.7 MG/DL
EGFR: 110 ML/MIN/1.73M2
EOSINOPHIL # BLD AUTO: 0.43 K/UL
EOSINOPHIL NFR BLD AUTO: 5.9 %
ESTIMATED AVERAGE GLUCOSE: 100 MG/DL
GLUCOSE SERPL-MCNC: 97 MG/DL
HBA1C MFR BLD HPLC: 5.1 %
HCT VFR BLD CALC: 44.9 %
HCV AB SER QL: NONREACTIVE
HCV S/CO RATIO: 0.14 S/CO
HDLC SERPL-MCNC: 49 MG/DL
HGB BLD-MCNC: 13.6 G/DL
HIV1+2 AB SPEC QL IA.RAPID: NONREACTIVE
IMM GRANULOCYTES NFR BLD AUTO: 0.1 %
LDLC SERPL CALC-MCNC: 99 MG/DL
LYMPHOCYTES # BLD AUTO: 2.23 K/UL
LYMPHOCYTES NFR BLD AUTO: 30.8 %
MAN DIFF?: NORMAL
MCHC RBC-ENTMCNC: 27.4 PG
MCHC RBC-ENTMCNC: 30.3 GM/DL
MCV RBC AUTO: 90.5 FL
MONOCYTES # BLD AUTO: 0.56 K/UL
MONOCYTES NFR BLD AUTO: 7.7 %
NEUTROPHILS # BLD AUTO: 3.93 K/UL
NEUTROPHILS NFR BLD AUTO: 54.4 %
NONHDLC SERPL-MCNC: 123 MG/DL
PLATELET # BLD AUTO: 309 K/UL
POTASSIUM SERPL-SCNC: 4.4 MMOL/L
PROT SERPL-MCNC: 6.8 G/DL
RBC # BLD: 4.96 M/UL
RBC # FLD: 16 %
SODIUM SERPL-SCNC: 139 MMOL/L
TRIGL SERPL-MCNC: 117 MG/DL
TSH SERPL-ACNC: 0.89 UIU/ML
WBC # FLD AUTO: 7.24 K/UL

## 2022-09-08 ENCOUNTER — APPOINTMENT (OUTPATIENT)
Dept: BARIATRICS | Facility: CLINIC | Age: 42
End: 2022-09-08

## 2022-09-08 PROCEDURE — 97803 MED NUTRITION INDIV SUBSEQ: CPT | Mod: 95

## 2022-09-18 ENCOUNTER — RX RENEWAL (OUTPATIENT)
Age: 42
End: 2022-09-18

## 2022-10-06 ENCOUNTER — APPOINTMENT (OUTPATIENT)
Dept: BARIATRICS | Facility: CLINIC | Age: 42
End: 2022-10-06

## 2022-10-06 VITALS — WEIGHT: 203 LBS | BODY MASS INDEX: 38.36 KG/M2

## 2022-10-06 PROCEDURE — 97803 MED NUTRITION INDIV SUBSEQ: CPT | Mod: 95

## 2022-10-12 ENCOUNTER — APPOINTMENT (OUTPATIENT)
Dept: FAMILY MEDICINE | Facility: CLINIC | Age: 42
End: 2022-10-12

## 2022-10-12 VITALS
HEART RATE: 70 BPM | OXYGEN SATURATION: 97 % | DIASTOLIC BLOOD PRESSURE: 80 MMHG | WEIGHT: 203 LBS | HEIGHT: 61 IN | SYSTOLIC BLOOD PRESSURE: 120 MMHG | BODY MASS INDEX: 38.33 KG/M2 | TEMPERATURE: 98 F

## 2022-10-12 PROCEDURE — 99214 OFFICE O/P EST MOD 30 MIN: CPT

## 2022-10-12 NOTE — HISTORY OF PRESENT ILLNESS
[FreeTextEntry1] : Follow up  [de-identified] : 41 year old female with pmh of HTN and  Robotic Sleeve Gastrectomy on 7/18/22 presenting for HTN follow up. Patient is on losartan 100mg and reports compliance with medication. She has been exercising daily and weating well and has lost about 32lbs since the surgery. Reports she feels well and has more energy.

## 2022-12-19 ENCOUNTER — NON-APPOINTMENT (OUTPATIENT)
Age: 42
End: 2022-12-19

## 2023-03-10 ENCOUNTER — NON-APPOINTMENT (OUTPATIENT)
Age: 43
End: 2023-03-10

## 2023-03-29 ENCOUNTER — APPOINTMENT (OUTPATIENT)
Dept: FAMILY MEDICINE | Facility: CLINIC | Age: 43
End: 2023-03-29

## 2023-04-04 NOTE — REVIEW OF SYSTEMS
[Negative] : Heme/Lymph Posterior Auricular Interpolation Flap Text: A decision was made to reconstruct the defect utilizing an interpolation axial flap and a staged reconstruction.  A telfa template was made of the defect.  This telfa template was then used to outline the posterior auricular interpolation flap.  The donor area for the pedicle flap was then injected with anesthesia.  The flap was excised through the skin and subcutaneous tissue down to the layer of the underlying musculature.  The pedicle flap was carefully excised within this deep plane to maintain its blood supply.  The edges of the donor site were undermined.   The donor site was closed in a primary fashion.  The pedicle was then rotated into position and sutured.  Once the tube was sutured into place, adequate blood supply was confirmed with blanching and refill.  The pedicle was then wrapped with xeroform gauze and dressed appropriately with a telfa and gauze bandage to ensure continued blood supply and protect the attached pedicle.

## 2023-04-12 ENCOUNTER — APPOINTMENT (OUTPATIENT)
Dept: FAMILY MEDICINE | Facility: CLINIC | Age: 43
End: 2023-04-12

## 2023-05-11 ENCOUNTER — RX RENEWAL (OUTPATIENT)
Age: 43
End: 2023-05-11

## 2023-05-15 ENCOUNTER — RESULT REVIEW (OUTPATIENT)
Age: 43
End: 2023-05-15

## 2023-06-08 ENCOUNTER — APPOINTMENT (OUTPATIENT)
Dept: BARIATRICS | Facility: CLINIC | Age: 43
End: 2023-06-08

## 2023-06-15 ENCOUNTER — APPOINTMENT (OUTPATIENT)
Dept: BARIATRICS | Facility: CLINIC | Age: 43
End: 2023-06-15

## 2023-08-01 LAB
ALBUMIN SERPL ELPH-MCNC: 4.2 G/DL
ALBUMIN SERPL ELPH-MCNC: 4.4 G/DL
ALP BLD-CCNC: 108 U/L
ALP BLD-CCNC: 111 U/L
ALT SERPL-CCNC: 14 U/L
ALT SERPL-CCNC: 16 U/L
ANION GAP SERPL CALC-SCNC: 11 MMOL/L
ANION GAP SERPL CALC-SCNC: 16 MMOL/L
AST SERPL-CCNC: 16 U/L
AST SERPL-CCNC: 18 U/L
BASOPHILS # BLD AUTO: 0.08 K/UL
BASOPHILS # BLD AUTO: 0.08 K/UL
BASOPHILS NFR BLD AUTO: 0.6 %
BASOPHILS NFR BLD AUTO: 0.7 %
BILIRUB SERPL-MCNC: 0.2 MG/DL
BILIRUB SERPL-MCNC: 0.3 MG/DL
BUN SERPL-MCNC: 7 MG/DL
BUN SERPL-MCNC: 8 MG/DL
CALCIUM SERPL-MCNC: 10.1 MG/DL
CALCIUM SERPL-MCNC: 9.9 MG/DL
CHLORIDE SERPL-SCNC: 96 MMOL/L
CHLORIDE SERPL-SCNC: 97 MMOL/L
CO2 SERPL-SCNC: 25 MMOL/L
CO2 SERPL-SCNC: 28 MMOL/L
CREAT SERPL-MCNC: 0.7 MG/DL
CREAT SERPL-MCNC: 0.8 MG/DL
CRP SERPL-MCNC: 20 MG/L
CRP SERPL-MCNC: 21 MG/L
EOSINOPHIL # BLD AUTO: 0.5 K/UL
EOSINOPHIL # BLD AUTO: 0.56 K/UL
EOSINOPHIL NFR BLD AUTO: 4.5 %
EOSINOPHIL NFR BLD AUTO: 4.5 %
ERYTHROCYTE [SEDIMENTATION RATE] IN BLOOD BY WESTERGREN METHOD: 42 MM/HR
ERYTHROCYTE [SEDIMENTATION RATE] IN BLOOD BY WESTERGREN METHOD: 43 MM/HR
GLUCOSE SERPL-MCNC: 102 MG/DL
GLUCOSE SERPL-MCNC: 90 MG/DL
HCT VFR BLD CALC: 44.6 %
HCT VFR BLD CALC: 44.6 %
HGB BLD-MCNC: 14 G/DL
HGB BLD-MCNC: 14.1 G/DL
IMM GRANULOCYTES NFR BLD AUTO: 0.4 %
IMM GRANULOCYTES NFR BLD AUTO: 0.6 %
LYMPHOCYTES # BLD AUTO: 3.29 K/UL
LYMPHOCYTES # BLD AUTO: 3.47 K/UL
LYMPHOCYTES NFR BLD AUTO: 26.5 %
LYMPHOCYTES NFR BLD AUTO: 31.1 %
MAN DIFF?: NORMAL
MAN DIFF?: NORMAL
MCHC RBC-ENTMCNC: 26.7 PG
MCHC RBC-ENTMCNC: 26.9 PG
MCHC RBC-ENTMCNC: 31.4 GM/DL
MCHC RBC-ENTMCNC: 31.6 GM/DL
MCV RBC AUTO: 84.5 FL
MCV RBC AUTO: 85.8 FL
MONOCYTES # BLD AUTO: 0.74 K/UL
MONOCYTES # BLD AUTO: 0.8 K/UL
MONOCYTES NFR BLD AUTO: 6.4 %
MONOCYTES NFR BLD AUTO: 6.6 %
NEUTROPHILS # BLD AUTO: 6.33 K/UL
NEUTROPHILS # BLD AUTO: 7.63 K/UL
NEUTROPHILS NFR BLD AUTO: 56.7 %
NEUTROPHILS NFR BLD AUTO: 61.4 %
PLATELET # BLD AUTO: 363 K/UL
PLATELET # BLD AUTO: 382 K/UL
POTASSIUM SERPL-SCNC: 4.6 MMOL/L
POTASSIUM SERPL-SCNC: 4.7 MMOL/L
PROT SERPL-MCNC: 7 G/DL
PROT SERPL-MCNC: 7.6 G/DL
RBC # BLD: 5.2 M/UL
RBC # BLD: 5.28 M/UL
RBC # FLD: 13.4 %
RBC # FLD: 13.4 %
SODIUM SERPL-SCNC: 136 MMOL/L
SODIUM SERPL-SCNC: 137 MMOL/L
WBC # FLD AUTO: 11.17 K/UL
WBC # FLD AUTO: 12.43 K/UL

## 2023-08-18 NOTE — REASON FOR VISIT
88 year old man with respiratory failure, Functional quadriplegia, Pneumonia, Esophageal cancer, advance care planning and encounter for palliative care.  [Follow-Up Visit] : a follow-up pain management visit [FreeTextEntry2] : back pain and knee pain

## 2023-09-07 ENCOUNTER — APPOINTMENT (OUTPATIENT)
Dept: FAMILY MEDICINE | Facility: CLINIC | Age: 43
End: 2023-09-07
Payer: COMMERCIAL

## 2023-09-07 VITALS
WEIGHT: 160 LBS | DIASTOLIC BLOOD PRESSURE: 80 MMHG | HEART RATE: 75 BPM | HEIGHT: 61 IN | BODY MASS INDEX: 30.21 KG/M2 | OXYGEN SATURATION: 98 % | SYSTOLIC BLOOD PRESSURE: 120 MMHG

## 2023-09-07 DIAGNOSIS — F40.298 OTHER SPECIFIED PHOBIA: ICD-10-CM

## 2023-09-07 DIAGNOSIS — M79.7 FIBROMYALGIA: ICD-10-CM

## 2023-09-07 DIAGNOSIS — Z23 ENCOUNTER FOR IMMUNIZATION: ICD-10-CM

## 2023-09-07 DIAGNOSIS — Z00.00 ENCOUNTER FOR GENERAL ADULT MEDICAL EXAMINATION W/OUT ABNORMAL FINDINGS: ICD-10-CM

## 2023-09-07 DIAGNOSIS — Z79.899 OTHER LONG TERM (CURRENT) DRUG THERAPY: ICD-10-CM

## 2023-09-07 DIAGNOSIS — Z01.818 ENCOUNTER FOR OTHER PREPROCEDURAL EXAMINATION: ICD-10-CM

## 2023-09-07 DIAGNOSIS — E66.9 OBESITY, UNSPECIFIED: ICD-10-CM

## 2023-09-07 DIAGNOSIS — Z90.3 ACQUIRED ABSENCE OF STOMACH [PART OF]: ICD-10-CM

## 2023-09-07 PROCEDURE — 99396 PREV VISIT EST AGE 40-64: CPT

## 2023-09-07 RX ORDER — LOSARTAN POTASSIUM 100 MG/1
100 TABLET, FILM COATED ORAL
Qty: 90 | Refills: 1 | Status: DISCONTINUED | COMMUNITY
Start: 2022-08-07 | End: 2023-09-07

## 2023-09-07 RX ORDER — ALPRAZOLAM 0.25 MG/1
0.25 TABLET ORAL 3 TIMES DAILY
Qty: 90 | Refills: 0 | Status: DISCONTINUED | COMMUNITY
Start: 2021-09-23 | End: 2023-09-07

## 2023-09-07 RX ORDER — LORAZEPAM 0.5 MG/1
0.5 TABLET ORAL
Qty: 6 | Refills: 0 | Status: COMPLETED | COMMUNITY
Start: 2023-09-07 | End: 2023-09-09

## 2023-09-07 NOTE — PHYSICAL EXAM
[No Acute Distress] : no acute distress [Well Nourished] : well nourished [Well Developed] : well developed [Well-Appearing] : well-appearing [Normal Sclera/Conjunctiva] : normal sclera/conjunctiva [PERRL] : pupils equal round and reactive to light [EOMI] : extraocular movements intact [Normal Outer Ear/Nose] : the outer ears and nose were normal in appearance [Normal Oropharynx] : the oropharynx was normal [No JVD] : no jugular venous distention [No Lymphadenopathy] : no lymphadenopathy [Supple] : supple [Thyroid Normal, No Nodules] : the thyroid was normal and there were no nodules present [No Respiratory Distress] : no respiratory distress  [No Accessory Muscle Use] : no accessory muscle use [Clear to Auscultation] : lungs were clear to auscultation bilaterally [Normal Rate] : normal rate  [Regular Rhythm] : with a regular rhythm [Normal S1, S2] : normal S1 and S2 [No Murmur] : no murmur heard [Pedal Pulses Present] : the pedal pulses are present [No Edema] : there was no peripheral edema [No Extremity Clubbing/Cyanosis] : no extremity clubbing/cyanosis [Soft] : abdomen soft [Non Tender] : non-tender [Non-distended] : non-distended [No Masses] : no abdominal mass palpated [No HSM] : no HSM [Normal Bowel Sounds] : normal bowel sounds [Normal Posterior Cervical Nodes] : no posterior cervical lymphadenopathy [Normal Anterior Cervical Nodes] : no anterior cervical lymphadenopathy [No CVA Tenderness] : no CVA  tenderness [No Spinal Tenderness] : no spinal tenderness [No Joint Swelling] : no joint swelling [Grossly Normal Strength/Tone] : grossly normal strength/tone [No Rash] : no rash [Coordination Grossly Intact] : coordination grossly intact [No Focal Deficits] : no focal deficits [Normal Gait] : normal gait [Speech Grossly Normal] : speech grossly normal [Normal Affect] : the affect was normal [Alert and Oriented x3] : oriented to person, place, and time [Normal Mood] : the mood was normal [Normal Insight/Judgement] : insight and judgment were intact

## 2023-09-07 NOTE — HEALTH RISK ASSESSMENT
[Very Good] : ~his/her~  mood as very good [Yes] : Yes [2 - 3 times a week (3 pts)] : 2 - 3  times a week (3 points) [1 or 2 (0 pts)] : 1 or 2 (0 points) [Never (0 pts)] : Never (0 points) [No falls in past year] : Patient reported no falls in the past year [0] : 2) Feeling down, depressed, or hopeless: Not at all (0) [PHQ-2 Negative - No further assessment needed] : PHQ-2 Negative - No further assessment needed [Patient reported colonoscopy was normal] : Patient reported colonoscopy was normal [With Family] : lives with family [Employed] : employed [] :  [Fully functional (bathing, dressing, toileting, transferring, walking, feeding)] : Fully functional (bathing, dressing, toileting, transferring, walking, feeding) [Fully functional (using the telephone, shopping, preparing meals, housekeeping, doing laundry, using] : Fully functional and needs no help or supervision to perform IADLs (using the telephone, shopping, preparing meals, housekeeping, doing laundry, using transportation, managing medications and managing finances) [Reports changes in hearing] : Reports changes in hearing [Never] : Never [Sexually Active] : sexually active [Feels Safe at Home] : Feels safe at home [Patient reported mammogram was normal] : Patient reported mammogram was normal [FreeTextEntry1] : None [de-identified] : Walking [de-identified] : Small amounts; bariatric surgery 07.22 [[id number]] : 0 [Reports changes in vision] : Reports no changes in vision [Reports changes in dental health] : Reports no changes in dental health [MammogramDate] : 05/23 [MammogramComments] : BIRADS2 [PapSmearComments] : Partial hysterectomy [ColonoscopyDate] : 04/22 [ColonoscopyComments] : internal hemorrhoids, repeat 2023 [HIVDate] : 08/22 [HIVComments] : negative [HepatitisCDate] : 08/22 [HepatitisCComments] : nonreactive [de-identified] : Hard of hearing

## 2023-09-07 NOTE — HISTORY OF PRESENT ILLNESS
[Spouse] : spouse [de-identified] : 42 year old female with PMH of HTN, morbid obesity s/p robotic sleeve gastrectomy 7/2022 (Dr. Morgan), fibromyalgia here for physical exam with no acute complaints.  Patient will be taking her first flight soon and has significant anxiety regarding this.  She self discontinued losartan 6 months ago due to episodes of lightheadedness and low BP at home. Believes once she lost significant weight her BP has been well controlled. Has monitor at home.  Fibromyalgia well controlled on Cymbalta.  Mammo/US recently completed, BI-RADS 2 in 5/2023.  Pap s/p partial hysterectomy with removal of cervix and no further need for cervical cancer screening

## 2023-09-14 ENCOUNTER — NON-APPOINTMENT (OUTPATIENT)
Age: 43
End: 2023-09-14

## 2023-09-21 ENCOUNTER — APPOINTMENT (OUTPATIENT)
Dept: FAMILY MEDICINE | Facility: CLINIC | Age: 43
End: 2023-09-21

## 2023-10-10 ENCOUNTER — APPOINTMENT (OUTPATIENT)
Dept: FAMILY MEDICINE | Facility: CLINIC | Age: 43
End: 2023-10-10

## 2023-10-16 ENCOUNTER — APPOINTMENT (OUTPATIENT)
Dept: OBGYN | Facility: CLINIC | Age: 43
End: 2023-10-16
Payer: COMMERCIAL

## 2023-10-16 VITALS
SYSTOLIC BLOOD PRESSURE: 124 MMHG | WEIGHT: 168 LBS | HEIGHT: 61 IN | BODY MASS INDEX: 31.72 KG/M2 | DIASTOLIC BLOOD PRESSURE: 80 MMHG

## 2023-10-16 DIAGNOSIS — R10.2 PELVIC AND PERINEAL PAIN: ICD-10-CM

## 2023-10-16 DIAGNOSIS — Z01.419 ENCOUNTER FOR GYNECOLOGICAL EXAMINATION (GENERAL) (ROUTINE) W/OUT ABNORMAL FINDINGS: ICD-10-CM

## 2023-10-16 DIAGNOSIS — N93.9 ABNORMAL UTERINE AND VAGINAL BLEEDING, UNSPECIFIED: ICD-10-CM

## 2023-10-16 PROCEDURE — 99396 PREV VISIT EST AGE 40-64: CPT

## 2023-10-18 LAB
APPEARANCE: CLEAR
BACTERIA: NEGATIVE /HPF
BILIRUBIN URINE: NEGATIVE
BLOOD URINE: NEGATIVE
CAST: 0 /LPF
COLOR: YELLOW
EPITHELIAL CELLS: 1 /HPF
GLUCOSE QUALITATIVE U: NEGATIVE MG/DL
KETONES URINE: NEGATIVE MG/DL
LEUKOCYTE ESTERASE URINE: ABNORMAL
MICROSCOPIC-UA: NORMAL
NITRITE URINE: NEGATIVE
PH URINE: 6
PROTEIN URINE: NEGATIVE MG/DL
RED BLOOD CELLS URINE: 1 /HPF
SPECIFIC GRAVITY URINE: 1.01
UROBILINOGEN URINE: 0.2 MG/DL
WHITE BLOOD CELLS URINE: 2 /HPF

## 2023-10-20 DIAGNOSIS — N39.0 URINARY TRACT INFECTION, SITE NOT SPECIFIED: ICD-10-CM

## 2023-10-20 LAB
A VAGINAE DNA VAG QL NAA+PROBE: NORMAL
BACTERIA UR CULT: ABNORMAL
BVAB2 DNA VAG QL NAA+PROBE: NORMAL
C KRUSEI DNA VAG QL NAA+PROBE: NEGATIVE
C TRACH RRNA SPEC QL NAA+PROBE: NEGATIVE
CANDIDA DNA VAG QL NAA+PROBE: NEGATIVE
MEGA1 DNA VAG QL NAA+PROBE: NORMAL
N GONORRHOEA RRNA SPEC QL NAA+PROBE: NEGATIVE
T VAGINALIS RRNA SPEC QL NAA+PROBE: NEGATIVE

## 2024-01-29 ENCOUNTER — RESULT REVIEW (OUTPATIENT)
Age: 44
End: 2024-01-29

## 2024-01-29 ENCOUNTER — TRANSCRIPTION ENCOUNTER (OUTPATIENT)
Age: 44
End: 2024-01-29

## 2024-01-30 ENCOUNTER — APPOINTMENT (OUTPATIENT)
Dept: OBGYN | Facility: CLINIC | Age: 44
End: 2024-01-30

## 2024-02-14 ENCOUNTER — APPOINTMENT (OUTPATIENT)
Dept: FAMILY MEDICINE | Facility: CLINIC | Age: 44
End: 2024-02-14
Payer: COMMERCIAL

## 2024-02-14 VITALS
BODY MASS INDEX: 32.47 KG/M2 | DIASTOLIC BLOOD PRESSURE: 100 MMHG | OXYGEN SATURATION: 98 % | SYSTOLIC BLOOD PRESSURE: 170 MMHG | WEIGHT: 172 LBS | HEIGHT: 61 IN | HEART RATE: 88 BPM

## 2024-02-14 PROCEDURE — 99214 OFFICE O/P EST MOD 30 MIN: CPT

## 2024-02-14 RX ORDER — CEPHALEXIN 500 MG/1
500 CAPSULE ORAL 4 TIMES DAILY
Qty: 20 | Refills: 0 | Status: DISCONTINUED | COMMUNITY
Start: 2023-10-20 | End: 2024-02-14

## 2024-02-28 ENCOUNTER — APPOINTMENT (OUTPATIENT)
Dept: FAMILY MEDICINE | Facility: CLINIC | Age: 44
End: 2024-02-28
Payer: COMMERCIAL

## 2024-02-28 VITALS
DIASTOLIC BLOOD PRESSURE: 80 MMHG | SYSTOLIC BLOOD PRESSURE: 140 MMHG | WEIGHT: 168 LBS | HEART RATE: 83 BPM | BODY MASS INDEX: 31.72 KG/M2 | HEIGHT: 61 IN | OXYGEN SATURATION: 100 %

## 2024-02-28 PROCEDURE — 99214 OFFICE O/P EST MOD 30 MIN: CPT

## 2024-02-28 NOTE — HISTORY OF PRESENT ILLNESS
[de-identified] : 43 year old female presenting for a follow up for HTN and anxiety. Patient is on losartan 50mg for HTN and reports compliance and also noticed improvement in her headaches. Patient is taking duloxetine and hydroxyzine prn at night and reports improvement in her anxiety and sleep.  [FreeTextEntry1] : Follow up

## 2024-03-10 ENCOUNTER — RX RENEWAL (OUTPATIENT)
Age: 44
End: 2024-03-10

## 2024-03-10 RX ORDER — HYDROXYZINE HYDROCHLORIDE 25 MG/1
25 TABLET ORAL
Qty: 30 | Refills: 0 | Status: ACTIVE | COMMUNITY
Start: 2024-02-14 | End: 1900-01-01

## 2024-03-27 ENCOUNTER — APPOINTMENT (OUTPATIENT)
Dept: FAMILY MEDICINE | Facility: CLINIC | Age: 44
End: 2024-03-27
Payer: COMMERCIAL

## 2024-03-27 VITALS
HEIGHT: 61 IN | SYSTOLIC BLOOD PRESSURE: 140 MMHG | DIASTOLIC BLOOD PRESSURE: 90 MMHG | BODY MASS INDEX: 32.1 KG/M2 | OXYGEN SATURATION: 100 % | WEIGHT: 170 LBS | HEART RATE: 75 BPM

## 2024-03-27 PROCEDURE — G2211 COMPLEX E/M VISIT ADD ON: CPT

## 2024-03-27 PROCEDURE — 99214 OFFICE O/P EST MOD 30 MIN: CPT

## 2024-03-27 RX ORDER — ALPRAZOLAM 0.25 MG/1
0.25 TABLET ORAL DAILY
Qty: 30 | Refills: 0 | Status: ACTIVE | COMMUNITY
Start: 2024-03-27 | End: 1900-01-01

## 2024-03-27 NOTE — HISTORY OF PRESENT ILLNESS
[FreeTextEntry1] : Follow up  [de-identified] : 43 year old female presenting for a follow up for HTN and anxiety. Patient is on losartan 50mg for HTN. Reports compliance with meds. Reports hytdroxyzine helps some, allows her to sleep, but still have anxiety during the day. Would like to try xanax as she was in the past.

## 2024-03-27 NOTE — HEALTH RISK ASSESSMENT
[Yes] : Yes [Monthly or less (1 pt)] : Monthly or less (1 point) [1 or 2 (0 pts)] : 1 or 2 (0 points) [No falls in past year] : Patient reported no falls in the past year [Never (0 pts)] : Never (0 points) [0] : 2) Feeling down, depressed, or hopeless: Not at all (0) [Never] : Never [[id number]] : 0

## 2024-04-16 ENCOUNTER — APPOINTMENT (OUTPATIENT)
Dept: FAMILY MEDICINE | Facility: CLINIC | Age: 44
End: 2024-04-16
Payer: COMMERCIAL

## 2024-04-16 VITALS
BODY MASS INDEX: 32.1 KG/M2 | HEIGHT: 61 IN | SYSTOLIC BLOOD PRESSURE: 140 MMHG | TEMPERATURE: 98.7 F | DIASTOLIC BLOOD PRESSURE: 82 MMHG | OXYGEN SATURATION: 98 % | WEIGHT: 170 LBS | HEART RATE: 77 BPM

## 2024-04-16 DIAGNOSIS — R35.0 FREQUENCY OF MICTURITION: ICD-10-CM

## 2024-04-16 LAB
BILIRUB UR QL STRIP: NEGATIVE
CLARITY UR: CLEAR
COLLECTION METHOD: NORMAL
GLUCOSE UR-MCNC: NEGATIVE
HCG UR QL: 0.2 EU/DL
HGB UR QL STRIP.AUTO: NEGATIVE
KETONES UR-MCNC: NEGATIVE
LEUKOCYTE ESTERASE UR QL STRIP: NEGATIVE
NITRITE UR QL STRIP: NEGATIVE
PH UR STRIP: 7
PROT UR STRIP-MCNC: NORMAL
SP GR UR STRIP: 1.02

## 2024-04-16 PROCEDURE — G2211 COMPLEX E/M VISIT ADD ON: CPT

## 2024-04-16 PROCEDURE — 99214 OFFICE O/P EST MOD 30 MIN: CPT

## 2024-04-16 PROCEDURE — 81003 URINALYSIS AUTO W/O SCOPE: CPT | Mod: QW

## 2024-04-16 RX ORDER — LOSARTAN POTASSIUM 100 MG/1
100 TABLET, FILM COATED ORAL
Qty: 90 | Refills: 0 | Status: ACTIVE | COMMUNITY
Start: 2024-02-14 | End: 1900-01-01

## 2024-04-16 RX ORDER — NITROFURANTOIN (MONOHYDRATE/MACROCRYSTALS) 25; 75 MG/1; MG/1
100 CAPSULE ORAL
Qty: 10 | Refills: 0 | Status: ACTIVE | COMMUNITY
Start: 2024-04-16 | End: 1900-01-01

## 2024-04-16 NOTE — HISTORY OF PRESENT ILLNESS
[FreeTextEntry1] : Follow up  [de-identified] : 43 year old female presenting for a follow up for HTN and anxiety. Patient is on losartan 100mg for HTN. Reports compliance with meds. Also complaints of urinary frequency and dysuria.

## 2024-04-16 NOTE — HEALTH RISK ASSESSMENT
[No] : No [No falls in past year] : Patient reported no falls in the past year [0] : 2) Feeling down, depressed, or hopeless: Not at all (0) [PHQ-2 Negative - No further assessment needed] : PHQ-2 Negative - No further assessment needed [Never] : Never [de-identified] : stop 2022 [[id number]] : 0

## 2024-04-19 LAB — BACTERIA UR CULT: NORMAL

## 2024-04-29 RX ORDER — DULOXETINE HYDROCHLORIDE 60 MG/1
60 CAPSULE, DELAYED RELEASE PELLETS ORAL
Qty: 90 | Refills: 2 | Status: ACTIVE | COMMUNITY
Start: 2021-12-13 | End: 1900-01-01

## 2024-05-15 ENCOUNTER — APPOINTMENT (OUTPATIENT)
Dept: FAMILY MEDICINE | Facility: CLINIC | Age: 44
End: 2024-05-15
Payer: COMMERCIAL

## 2024-05-15 VITALS
SYSTOLIC BLOOD PRESSURE: 130 MMHG | HEART RATE: 78 BPM | DIASTOLIC BLOOD PRESSURE: 80 MMHG | TEMPERATURE: 99.1 F | OXYGEN SATURATION: 98 %

## 2024-05-15 DIAGNOSIS — F41.9 ANXIETY DISORDER, UNSPECIFIED: ICD-10-CM

## 2024-05-15 DIAGNOSIS — I10 ESSENTIAL (PRIMARY) HYPERTENSION: ICD-10-CM

## 2024-05-15 PROCEDURE — 99213 OFFICE O/P EST LOW 20 MIN: CPT

## 2024-05-15 PROCEDURE — G2211 COMPLEX E/M VISIT ADD ON: CPT

## 2024-05-15 NOTE — HEALTH RISK ASSESSMENT
[0] : 2) Feeling down, depressed, or hopeless: Not at all (0) [PHQ-2 Negative - No further assessment needed] : PHQ-2 Negative - No further assessment needed [[id number]] : 0 [Never] : Never

## 2024-05-15 NOTE — HISTORY OF PRESENT ILLNESS
[FreeTextEntry1] : Follow up  [de-identified] : 43 year old female presenting for a follow up for HTN and anxiety. Patient is on losartan 100mg for HTN. Reports she feels well. Hasn't had any headaches. anxiety is under control.

## 2024-07-15 ENCOUNTER — RESULT REVIEW (OUTPATIENT)
Age: 44
End: 2024-07-15

## 2024-07-17 ENCOUNTER — TRANSCRIPTION ENCOUNTER (OUTPATIENT)
Age: 44
End: 2024-07-17

## 2024-07-18 ENCOUNTER — NON-APPOINTMENT (OUTPATIENT)
Age: 44
End: 2024-07-18

## 2024-07-22 ENCOUNTER — APPOINTMENT (OUTPATIENT)
Dept: SURGERY | Facility: CLINIC | Age: 44
End: 2024-07-22
Payer: COMMERCIAL

## 2024-07-22 ENCOUNTER — RX RENEWAL (OUTPATIENT)
Age: 44
End: 2024-07-22

## 2024-07-22 VITALS — DIASTOLIC BLOOD PRESSURE: 102 MMHG | HEART RATE: 97 BPM | TEMPERATURE: 98.2 F | SYSTOLIC BLOOD PRESSURE: 175 MMHG

## 2024-07-22 DIAGNOSIS — N73.9 FEMALE PELVIC INFLAMMATORY DISEASE, UNSPECIFIED: ICD-10-CM

## 2024-07-22 PROCEDURE — 99213 OFFICE O/P EST LOW 20 MIN: CPT

## 2024-07-22 RX ORDER — AMOXICILLIN AND CLAVULANATE POTASSIUM 875; 125 MG/1; MG/1
875-125 TABLET, COATED ORAL TWICE DAILY
Qty: 20 | Refills: 0 | Status: ACTIVE | COMMUNITY
Start: 2024-07-18

## 2024-07-22 NOTE — ASSESSMENT
[FreeTextEntry1] : 44 yo F s/p IR drainage of pelvic abscess of unclear etiology.  Advised to monitor for recurrence of symptoms Finished abx course Follow up as needed

## 2024-07-22 NOTE — CONSULT LETTER
[Dear  ___] : Dear  [unfilled], [Courtesy Letter:] : I had the pleasure of seeing your patient, [unfilled], in my office today. [Please see my note below.] : Please see my note below. [Consult Closing:] : Thank you very much for allowing me to participate in the care of this patient.  If you have any questions, please do not hesitate to contact me. [Sincerely,] : Sincerely, [FreeTextEntry3] : Jannet Perez MD

## 2024-07-22 NOTE — PHYSICAL EXAM
[Calm] : calm [de-identified] : NAD, well-appearing [de-identified] : anicteric [de-identified] : soft, nondistended, nontender; posterior drain removed

## 2024-07-22 NOTE — HISTORY OF PRESENT ILLNESS
[de-identified] : Patient returns for follow up after hospitalization for pelvic abscess last week. It was unclear whether this was an abscess related to a microperforation of the sigmoid vs gyn in origin. She did have some drainage vaginally at the time. She underwent IR drainage of the collection. This grew few Actinomyces georgiae. She presents now with resolution of her symptoms, minimal drainage from the IR drain and no fevers. She continues to take her Augmentin as prescribed.

## 2024-08-20 ENCOUNTER — RX RENEWAL (OUTPATIENT)
Age: 44
End: 2024-08-20

## 2024-09-13 ENCOUNTER — APPOINTMENT (OUTPATIENT)
Dept: FAMILY MEDICINE | Facility: CLINIC | Age: 44
End: 2024-09-13
Payer: COMMERCIAL

## 2024-09-13 VITALS
HEIGHT: 61 IN | SYSTOLIC BLOOD PRESSURE: 138 MMHG | HEART RATE: 92 BPM | DIASTOLIC BLOOD PRESSURE: 88 MMHG | BODY MASS INDEX: 37.38 KG/M2 | WEIGHT: 198 LBS

## 2024-09-13 DIAGNOSIS — Z12.31 ENCOUNTER FOR SCREENING MAMMOGRAM FOR MALIGNANT NEOPLASM OF BREAST: ICD-10-CM

## 2024-09-13 DIAGNOSIS — Z00.00 ENCOUNTER FOR GENERAL ADULT MEDICAL EXAMINATION W/OUT ABNORMAL FINDINGS: ICD-10-CM

## 2024-09-13 PROCEDURE — 36415 COLL VENOUS BLD VENIPUNCTURE: CPT

## 2024-09-13 PROCEDURE — 99396 PREV VISIT EST AGE 40-64: CPT

## 2024-09-13 NOTE — HISTORY OF PRESENT ILLNESS
[FreeTextEntry1] : Annual  [de-identified] : 43 year old female with pmh of HTN and anxiety presenting for an annual physical exam.  July 2024 - had a rupture pelvis abscess that was drained and treated with antibiotics. resolved now.

## 2024-09-13 NOTE — HEALTH RISK ASSESSMENT
[Good] : ~his/her~  mood as  good [No falls in past year] : Patient reported no falls in the past year [0] : 2) Feeling down, depressed, or hopeless: Not at all (0) [PHQ-2 Negative - No further assessment needed] : PHQ-2 Negative - No further assessment needed [Never] : Never [With Family] : lives with family [# of Members in Household ___] :  household currently consist of [unfilled] member(s) [Employed] : employed [College] : College [] :  [# Of Children ___] : has [unfilled] children [No] : In the past 12 months have you used drugs other than those required for medical reasons? No [Patient reported mammogram was normal] : Patient reported mammogram was normal [Patient reported colonoscopy was normal] : Patient reported colonoscopy was normal [HIV test declined] : HIV test declined [Hepatitis C test declined] : Hepatitis C test declined [None] : None [Fully functional (bathing, dressing, toileting, transferring, walking, feeding)] : Fully functional (bathing, dressing, toileting, transferring, walking, feeding) [Fully functional (using the telephone, shopping, preparing meals, housekeeping, doing laundry, using] : Fully functional and needs no help or supervision to perform IADLs (using the telephone, shopping, preparing meals, housekeeping, doing laundry, using transportation, managing medications and managing finances) [Reports normal functional visual acuity (ie: able to read med bottle)] : Reports normal functional visual acuity [de-identified] : active [de-identified] : good [[id number]] : 0 [Change in mental status noted] : No change in mental status noted [Reports changes in hearing] : Reports no changes in hearing [Reports changes in vision] : Reports no changes in vision [Reports changes in dental health] : Reports no changes in dental health [MammogramDate] : 05/23 [MammogramComments] : Referred  [PapSmearComments] : Partial hysterectomy due to prolapsed uterus [ColonoscopyDate] : 04/22 [ColonoscopyComments] : f/u in 10 years

## 2024-09-13 NOTE — ASSESSMENT
[FreeTextEntry1] : 43 year old female presented for an annual physical exam.  routine blood work today, blood collected in the office. up to date with screening. will call back with results.

## 2024-09-16 LAB
25(OH)D3 SERPL-MCNC: 38.3 NG/ML
ALBUMIN SERPL ELPH-MCNC: 4.1 G/DL
ALP BLD-CCNC: 104 U/L
ALT SERPL-CCNC: 9 U/L
ANION GAP SERPL CALC-SCNC: 10 MMOL/L
AST SERPL-CCNC: 20 U/L
BASOPHILS # BLD AUTO: 0.08 K/UL
BASOPHILS NFR BLD AUTO: 0.9 %
BILIRUB SERPL-MCNC: 0.3 MG/DL
BUN SERPL-MCNC: 12 MG/DL
CALCIUM SERPL-MCNC: 9.4 MG/DL
CHLORIDE SERPL-SCNC: 102 MMOL/L
CHOLEST SERPL-MCNC: 201 MG/DL
CO2 SERPL-SCNC: 27 MMOL/L
CREAT SERPL-MCNC: 0.68 MG/DL
EGFR: 109 ML/MIN/1.73M2
EOSINOPHIL # BLD AUTO: 0.37 K/UL
EOSINOPHIL NFR BLD AUTO: 4 %
ESTIMATED AVERAGE GLUCOSE: 105 MG/DL
FOLATE SERPL-MCNC: 8.4 NG/ML
GLUCOSE SERPL-MCNC: 108 MG/DL
HBA1C MFR BLD HPLC: 5.3 %
HCT VFR BLD CALC: 44 %
HDLC SERPL-MCNC: 82 MG/DL
HGB BLD-MCNC: 13.8 G/DL
IMM GRANULOCYTES NFR BLD AUTO: 0.7 %
LDLC SERPL CALC-MCNC: 90 MG/DL
LYMPHOCYTES # BLD AUTO: 2.17 K/UL
LYMPHOCYTES NFR BLD AUTO: 23.5 %
MAN DIFF?: NORMAL
MCHC RBC-ENTMCNC: 28.9 PG
MCHC RBC-ENTMCNC: 31.4 GM/DL
MCV RBC AUTO: 92.2 FL
MONOCYTES # BLD AUTO: 0.52 K/UL
MONOCYTES NFR BLD AUTO: 5.6 %
NEUTROPHILS # BLD AUTO: 6.03 K/UL
NEUTROPHILS NFR BLD AUTO: 65.3 %
NONHDLC SERPL-MCNC: 119 MG/DL
PLATELET # BLD AUTO: 341 K/UL
POTASSIUM SERPL-SCNC: 5.4 MMOL/L
PROT SERPL-MCNC: 7.1 G/DL
RBC # BLD: 4.77 M/UL
RBC # FLD: 15.5 %
SODIUM SERPL-SCNC: 139 MMOL/L
TRIGL SERPL-MCNC: 177 MG/DL
TSH SERPL-ACNC: 0.7 UIU/ML
VIT B12 SERPL-MCNC: 336 PG/ML
WBC # FLD AUTO: 9.23 K/UL

## 2025-01-29 ENCOUNTER — NON-APPOINTMENT (OUTPATIENT)
Age: 45
End: 2025-01-29

## 2025-01-31 ENCOUNTER — RX RENEWAL (OUTPATIENT)
Age: 45
End: 2025-01-31

## 2025-04-01 ENCOUNTER — RX RENEWAL (OUTPATIENT)
Age: 45
End: 2025-04-01

## 2025-04-21 ENCOUNTER — RX RENEWAL (OUTPATIENT)
Age: 45
End: 2025-04-21